# Patient Record
Sex: MALE | Race: OTHER | HISPANIC OR LATINO | Employment: FULL TIME | URBAN - METROPOLITAN AREA
[De-identification: names, ages, dates, MRNs, and addresses within clinical notes are randomized per-mention and may not be internally consistent; named-entity substitution may affect disease eponyms.]

---

## 2017-03-22 ENCOUNTER — ALLSCRIPTS OFFICE VISIT (OUTPATIENT)
Dept: OTHER | Facility: OTHER | Age: 42
End: 2017-03-22

## 2018-01-12 NOTE — PROGRESS NOTES
Assessment    1  Encounter for preventive health examination (V70 0) (Z00 00)   2  Lipid screening (V77 91) (Z13 220)   3  Screen for colon cancer (V76 51) (Z12 11)   4  Need for diphtheria-tetanus-pertussis (Tdap) vaccine, adult/adolescent (V06 1) (Z23)    Plan  FamHx: Colon cancer, Screen for colon cancer    · COLONOSCOPY; Status:Active; Requested for:22Mar2017; Health Maintenance, Lipid screening    · (1) COMPREHENSIVE METABOLIC PANEL; Status:Active; Requested for:22Mar2017;    · (1) LIPID PANEL, FASTING; Status:Active; Requested for:22Mar2017;   Need for diphtheria-tetanus-pertussis (Tdap) vaccine, adult/adolescent    · Tdap (Adacel); INJECT 0 5  ML Intramuscular; To Be Done: [id number]    Discussion/Summary  Impression: health maintenance visit, healthy adult male  Currently, he eats an adequate diet and has an adequate exercise regimen  Prostate cancer screening: PSA is not indicated  Testicular cancer screening: monthly self testicular exam was advised  Colorectal cancer screening: the risks and benefits of colorectal cancer screening were discussed and colonoscopy has been ordered  Screening lab work includes glucose and lipid profile  The immunizations will be given as outlined in the orders  He was advised to be evaluated by an optometrist and a dentist  Advice and education were given regarding nutrition, aerobic exercise, weight bearing exercise, weight loss, cardiovascular risk reduction, sunscreen use, self skin examination and seat belt use  Patient discussion: discussed with the patient  The patient was counseled regarding diagnostic results, instructions for management, risk factor reductions, prognosis, patient and family education, impressions, risks and benefits of treatment options, importance of compliance with treatment  Immunization Counseling The parent/guardian was counseled on the following vaccine components: Tdap  Total number of vaccine components counseled: 3     Possible side effects of new medications were reviewed with the patient/guardian today  The treatment plan was reviewed with the patient/guardian  The patient/guardian understands and agrees with the treatment plan      Chief Complaint  NPA Here for a physical      History of Present Illness  HM, Adult Male: The patient is being seen for a health maintenance evaluation  The last health maintenance visit was year(s) ago  Social History: Household members include spouse and 2 son(s)  He is   Work status: working full time and occupation: receiving/shipping  The patient has never smoked cigarettes  He reports rare alcohol use  He has never used illicit drugs  General Health: The patient's health since the last visit is described as good  He does not have regular dental visits  He complains of vision problems  Vision care includes no need for vision correction and no recent eye examination  He denies hearing loss  Immunizations status:  ?Tdap done with VISA 6 yrs ago  Lifestyle:  He consumes a diverse and healthy diet  (healthy, diverse -- protein from lean meat/fish )   He exercises regularly  He exercises 3 or more times per week  Exercise includes walking, aerobic conditioning and soccer 1-2x per wk  Reproductive health:  the patient is sexually active  He denies erectile dysfunction  Screening: cancer screening reviewed and current  Prostate cancer screening includes no previous evaluation  Testicular cancer screening includes irregular self testicular examinations  Colorectal cancer screening includes no previous screening  metabolic screening reviewed and current  risk screening reviewed and current  Safety elements used: seat belt, safe driving habits, sunscreen and smoke detector  Review of Systems    Constitutional: No fever or chills, feels well, no tiredness, no recent weight gain or weight loss  Eyes: no eye pain and no eyesight problems     ENT: no earache, no sore throat and no nasal discharge  Cardiovascular: no chest pain, no palpitations and no extremity edema  Respiratory: no shortness of breath, no cough and no wheezing  Gastrointestinal: No complaints of abdominal pain, no constipation, no nausea or vomiting, no diarrhea or bloody stools  Genitourinary: no dysuria and no incontinence  Musculoskeletal: no arthralgias and no myalgias  Integumentary: no rashes and no skin lesions  Neurological: no headache, no numbness, no tingling and no confusion  Psychiatric: no anxiety and no depression  Endocrine: no hot flashes and no feelings of weakness  Hematologic/Lymphatic: no swollen glands, no tendency for easy bleeding and no tendency for easy bruising  Over the past 2 weeks, how often have you been bothered by the following problems? 1 ) Little interest or pleasure in doing things? Not at all    2 ) Feeling down, depressed or hopeless? Not at all  ROS reviewed  Past Medical History    · History of Tinnitus, unspecified laterality (388 30) (H93 19)    Surgical History    · Denied: History Of Prior Surgery    Family History  Mother    · Family history of Colon cancer   · Family history of malignant neoplasm of stomach (V16 0) (Z80 0)  Grandparent    · Family history of diabetes mellitus (V18 0) (Z83 3)  Maternal Grandmother    · Family history of Type 2 diabetes mellitus without complication, unspecified long term  insulin use status  Grandfather    · Family history of malignant neoplasm (V16 9) (Z80 9)  Maternal Grandfather    · Family history of myocardial infarction (V17 3) (Z82 49)    Social History    · Never a smoker   · No alcohol use   · No illicit drug use    Current Meds   1  No Reported Medications Recorded    Allergies    1  No Known Drug Allergies    2  No Known Environmental Allergies   3   No Known Food Allergies    Vitals   Recorded: [id number] 02:59PM   Temperature 98 1 F   Heart Rate 74   Respiration 16   Systolic 803   Diastolic 82   Height 5 ft 6 in   Weight 151 lb    BMI Calculated 24 37   BSA Calculated 1 77   O2 Saturation 98     Physical Exam    Constitutional   General appearance: No acute distress, well appearing and well nourished  Head and Face   Head and face: Normal     Palpation of the face and sinuses: No sinus tenderness  Eyes   Conjunctiva and lids: No erythema, swelling or discharge  Pupils and irises: Equal, round, reactive to light  Ears, Nose, Mouth, and Throat   External inspection of ears and nose: Normal     Otoscopic examination: Tympanic membranes translucent with normal light reflex  Canals patent without erythema  Hearing: Normal     Nasal mucosa, septum, and turbinates: Normal without edema or erythema  Lips, teeth, and gums: Normal, good dentition  Oropharynx: Normal with no erythema, edema, exudate or lesions  Neck   Neck: Supple, symmetric, trachea midline, no masses  Thyroid: Normal, no thyromegaly  Pulmonary   Respiratory effort: No increased work of breathing or signs of respiratory distress  Auscultation of lungs: Clear to auscultation  Cardiovascular   Auscultation of heart: Normal rate and rhythm, normal S1 and S2, no murmurs  Examination of extremities for edema and/or varicosities: Normal     Abdomen   Abdomen: Non-tender, no masses  Liver and spleen: No hepatomegaly or splenomegaly  Lymphatic   Palpation of lymph nodes in neck: No lymphadenopathy  Musculoskeletal   Gait and station: Normal     Inspection/palpation of digits and nails: Normal without clubbing or cyanosis  Inspection/palpation of joints, bones, and muscles: Normal     Range of motion: Normal     Stability: Normal     Muscle strength/tone: Normal     Skin   Skin and subcutaneous tissue: Normal without rashes or lesions  Neurologic   Cranial nerves: Cranial nerves 2-12 intact  Reflexes: 2+ and symmetric  Sensation: No sensory loss      Psychiatric   Orientation to person, place and time: Normal  Mood and affect: Normal        Signatures   Electronically signed by : Arvind Sharma DO; Mar 22 2017  4:01PM EST                       (Author)

## 2018-01-13 VITALS
DIASTOLIC BLOOD PRESSURE: 82 MMHG | BODY MASS INDEX: 24.27 KG/M2 | SYSTOLIC BLOOD PRESSURE: 128 MMHG | HEART RATE: 74 BPM | HEIGHT: 66 IN | OXYGEN SATURATION: 98 % | WEIGHT: 151 LBS | RESPIRATION RATE: 16 BRPM | TEMPERATURE: 98.1 F

## 2019-04-01 ENCOUNTER — OFFICE VISIT (OUTPATIENT)
Dept: FAMILY MEDICINE CLINIC | Facility: OTHER | Age: 44
End: 2019-04-01
Payer: COMMERCIAL

## 2019-04-01 VITALS
BODY MASS INDEX: 26.81 KG/M2 | HEIGHT: 66 IN | DIASTOLIC BLOOD PRESSURE: 78 MMHG | TEMPERATURE: 97.7 F | WEIGHT: 166.8 LBS | HEART RATE: 74 BPM | OXYGEN SATURATION: 98 % | SYSTOLIC BLOOD PRESSURE: 104 MMHG

## 2019-04-01 DIAGNOSIS — R59.0 CERVICAL ADENOPATHY: Primary | ICD-10-CM

## 2019-04-01 DIAGNOSIS — R51.9 ACUTE NONINTRACTABLE HEADACHE, UNSPECIFIED HEADACHE TYPE: ICD-10-CM

## 2019-04-01 DIAGNOSIS — R49.0 HOARSENESS: ICD-10-CM

## 2019-04-01 PROBLEM — R43.9 SMELL DISTURBANCE: Status: ACTIVE | Noted: 2019-04-01

## 2019-04-01 PROCEDURE — 99214 OFFICE O/P EST MOD 30 MIN: CPT | Performed by: NURSE PRACTITIONER

## 2019-04-01 PROCEDURE — 3008F BODY MASS INDEX DOCD: CPT | Performed by: NURSE PRACTITIONER

## 2019-04-03 ENCOUNTER — APPOINTMENT (OUTPATIENT)
Dept: LAB | Facility: CLINIC | Age: 44
End: 2019-04-03
Payer: COMMERCIAL

## 2019-04-03 DIAGNOSIS — R59.0 CERVICAL ADENOPATHY: ICD-10-CM

## 2019-04-03 DIAGNOSIS — R51.9 ACUTE NONINTRACTABLE HEADACHE, UNSPECIFIED HEADACHE TYPE: ICD-10-CM

## 2019-04-03 DIAGNOSIS — R49.0 HOARSENESS: ICD-10-CM

## 2019-04-03 LAB
ALBUMIN SERPL BCP-MCNC: 4.2 G/DL (ref 3.5–5)
ALP SERPL-CCNC: 71 U/L (ref 46–116)
ALT SERPL W P-5'-P-CCNC: 42 U/L (ref 12–78)
ANION GAP SERPL CALCULATED.3IONS-SCNC: 8 MMOL/L (ref 4–13)
AST SERPL W P-5'-P-CCNC: 28 U/L (ref 5–45)
BASOPHILS # BLD AUTO: 0.03 THOUSANDS/ΜL (ref 0–0.1)
BASOPHILS NFR BLD AUTO: 1 % (ref 0–1)
BILIRUB SERPL-MCNC: 0.6 MG/DL (ref 0.2–1)
BUN SERPL-MCNC: 11 MG/DL (ref 5–25)
CALCIUM SERPL-MCNC: 9.3 MG/DL (ref 8.3–10.1)
CHLORIDE SERPL-SCNC: 103 MMOL/L (ref 100–108)
CO2 SERPL-SCNC: 28 MMOL/L (ref 21–32)
CREAT SERPL-MCNC: 1 MG/DL (ref 0.6–1.3)
CRP SERPL QL: 4 MG/L
EOSINOPHIL # BLD AUTO: 0.1 THOUSAND/ΜL (ref 0–0.61)
EOSINOPHIL NFR BLD AUTO: 2 % (ref 0–6)
ERYTHROCYTE [DISTWIDTH] IN BLOOD BY AUTOMATED COUNT: 11.6 % (ref 11.6–15.1)
ERYTHROCYTE [SEDIMENTATION RATE] IN BLOOD: 1 MM/HOUR (ref 0–10)
GFR SERPL CREATININE-BSD FRML MDRD: 92 ML/MIN/1.73SQ M
GLUCOSE P FAST SERPL-MCNC: 94 MG/DL (ref 65–99)
HCT VFR BLD AUTO: 45.2 % (ref 36.5–49.3)
HGB BLD-MCNC: 15.3 G/DL (ref 12–17)
IMM GRANULOCYTES # BLD AUTO: 0.02 THOUSAND/UL (ref 0–0.2)
IMM GRANULOCYTES NFR BLD AUTO: 0 % (ref 0–2)
LYMPHOCYTES # BLD AUTO: 1.89 THOUSANDS/ΜL (ref 0.6–4.47)
LYMPHOCYTES NFR BLD AUTO: 29 % (ref 14–44)
MCH RBC QN AUTO: 30.4 PG (ref 26.8–34.3)
MCHC RBC AUTO-ENTMCNC: 33.8 G/DL (ref 31.4–37.4)
MCV RBC AUTO: 90 FL (ref 82–98)
MONOCYTES # BLD AUTO: 0.48 THOUSAND/ΜL (ref 0.17–1.22)
MONOCYTES NFR BLD AUTO: 7 % (ref 4–12)
NEUTROPHILS # BLD AUTO: 4.11 THOUSANDS/ΜL (ref 1.85–7.62)
NEUTS SEG NFR BLD AUTO: 61 % (ref 43–75)
NRBC BLD AUTO-RTO: 0 /100 WBCS
PLATELET # BLD AUTO: 202 THOUSANDS/UL (ref 149–390)
PMV BLD AUTO: 10 FL (ref 8.9–12.7)
POTASSIUM SERPL-SCNC: 4 MMOL/L (ref 3.5–5.3)
PROT SERPL-MCNC: 7.5 G/DL (ref 6.4–8.2)
RBC # BLD AUTO: 5.04 MILLION/UL (ref 3.88–5.62)
SODIUM SERPL-SCNC: 139 MMOL/L (ref 136–145)
WBC # BLD AUTO: 6.63 THOUSAND/UL (ref 4.31–10.16)

## 2019-04-03 PROCEDURE — 36415 COLL VENOUS BLD VENIPUNCTURE: CPT

## 2019-04-03 PROCEDURE — 85025 COMPLETE CBC W/AUTO DIFF WBC: CPT

## 2019-04-03 PROCEDURE — 85652 RBC SED RATE AUTOMATED: CPT

## 2019-04-03 PROCEDURE — 86664 EPSTEIN-BARR NUCLEAR ANTIGEN: CPT

## 2019-04-03 PROCEDURE — 86140 C-REACTIVE PROTEIN: CPT

## 2019-04-03 PROCEDURE — 86665 EPSTEIN-BARR CAPSID VCA: CPT

## 2019-04-03 PROCEDURE — 80053 COMPREHEN METABOLIC PANEL: CPT

## 2019-04-03 PROCEDURE — 86663 EPSTEIN-BARR ANTIBODY: CPT

## 2019-04-04 LAB
EBV EA IGG SER-ACNC: <9 U/ML (ref 0–8.9)
EBV NA IGG SER IA-ACNC: 74.2 U/ML (ref 0–17.9)
EBV PATRN SPEC IB-IMP: ABNORMAL
EBV VCA IGG SER IA-ACNC: 223 U/ML (ref 0–17.9)
EBV VCA IGM SER IA-ACNC: <36 U/ML (ref 0–35.9)

## 2019-04-05 ENCOUNTER — TELEPHONE (OUTPATIENT)
Dept: FAMILY MEDICINE CLINIC | Facility: OTHER | Age: 44
End: 2019-04-05

## 2019-04-15 ENCOUNTER — HOSPITAL ENCOUNTER (OUTPATIENT)
Dept: ULTRASOUND IMAGING | Facility: HOSPITAL | Age: 44
Discharge: HOME | End: 2019-04-15
Attending: NURSE PRACTITIONER
Payer: COMMERCIAL

## 2019-04-15 DIAGNOSIS — R59.0 CERVICAL ADENOPATHY: ICD-10-CM

## 2019-04-15 PROCEDURE — 76536 US EXAM OF HEAD AND NECK: CPT

## 2019-07-15 ENCOUNTER — TRANSCRIBE ORDERS (OUTPATIENT)
Dept: LAB | Facility: CLINIC | Age: 44
End: 2019-07-15

## 2019-07-15 ENCOUNTER — APPOINTMENT (OUTPATIENT)
Dept: LAB | Facility: CLINIC | Age: 44
End: 2019-07-15
Payer: COMMERCIAL

## 2019-07-15 DIAGNOSIS — K21.9 GASTROESOPHAGEAL REFLUX DISEASE, ESOPHAGITIS PRESENCE NOT SPECIFIED: Primary | ICD-10-CM

## 2019-07-15 DIAGNOSIS — J38.00 VOCAL CORD PARESIS: ICD-10-CM

## 2019-07-15 DIAGNOSIS — R47.02 DYSPHASIA: ICD-10-CM

## 2019-07-15 DIAGNOSIS — K21.9 GASTROESOPHAGEAL REFLUX DISEASE, ESOPHAGITIS PRESENCE NOT SPECIFIED: ICD-10-CM

## 2019-07-15 DIAGNOSIS — K90.41 GLUTEN INTOLERANCE: ICD-10-CM

## 2019-07-15 LAB
T4 FREE SERPL-MCNC: 0.89 NG/DL (ref 0.76–1.46)
TSH SERPL DL<=0.05 MIU/L-ACNC: 1.31 UIU/ML (ref 0.36–3.74)

## 2019-07-15 PROCEDURE — 83519 RIA NONANTIBODY: CPT

## 2019-07-15 PROCEDURE — 86618 LYME DISEASE ANTIBODY: CPT

## 2019-07-15 PROCEDURE — 83516 IMMUNOASSAY NONANTIBODY: CPT

## 2019-07-15 PROCEDURE — 86430 RHEUMATOID FACTOR TEST QUAL: CPT

## 2019-07-15 PROCEDURE — 36415 COLL VENOUS BLD VENIPUNCTURE: CPT

## 2019-07-15 PROCEDURE — 86038 ANTINUCLEAR ANTIBODIES: CPT

## 2019-07-15 PROCEDURE — 84443 ASSAY THYROID STIM HORMONE: CPT

## 2019-07-15 PROCEDURE — 84439 ASSAY OF FREE THYROXINE: CPT

## 2019-07-16 LAB
B BURGDOR IGG SER IA-ACNC: 0.11
B BURGDOR IGM SER IA-ACNC: 0.37
RHEUMATOID FACT SER QL LA: NEGATIVE

## 2019-07-17 LAB — RYE IGE QN: NEGATIVE

## 2019-07-22 LAB — ACHR MOD AB/ACHR TOTAL SFR SER: <12 % (ref 0–20)

## 2019-07-23 LAB — MISCELLANEOUS LAB TEST RESULT: NORMAL

## 2019-07-25 ENCOUNTER — HOSPITAL ENCOUNTER (EMERGENCY)
Facility: HOSPITAL | Age: 44
Discharge: HOME | End: 2019-07-25
Attending: EMERGENCY MEDICINE | Admitting: EMERGENCY MEDICINE
Payer: COMMERCIAL

## 2019-07-25 ENCOUNTER — APPOINTMENT (EMERGENCY)
Dept: CT IMAGING | Facility: HOSPITAL | Age: 44
End: 2019-07-25
Payer: COMMERCIAL

## 2019-07-25 VITALS
WEIGHT: 160.94 LBS | TEMPERATURE: 98.7 F | DIASTOLIC BLOOD PRESSURE: 80 MMHG | OXYGEN SATURATION: 98 % | BODY MASS INDEX: 28.51 KG/M2 | RESPIRATION RATE: 16 BRPM | SYSTOLIC BLOOD PRESSURE: 135 MMHG | HEART RATE: 83 BPM

## 2019-07-25 DIAGNOSIS — R10.9 ACUTE RIGHT FLANK PAIN: Primary | ICD-10-CM

## 2019-07-25 LAB
ALBUMIN SERPL BCP-MCNC: 4 G/DL (ref 3.5–5)
ALP SERPL-CCNC: 69 U/L (ref 46–116)
ALT SERPL W P-5'-P-CCNC: 36 U/L (ref 12–78)
ANION GAP SERPL CALCULATED.3IONS-SCNC: 7 MMOL/L (ref 4–13)
AST SERPL W P-5'-P-CCNC: 25 U/L (ref 5–45)
BACTERIA UR QL AUTO: ABNORMAL /HPF
BASOPHILS # BLD AUTO: 0.02 THOUSANDS/ΜL (ref 0–0.1)
BASOPHILS NFR BLD AUTO: 0 % (ref 0–1)
BILIRUB SERPL-MCNC: 0.4 MG/DL (ref 0.2–1)
BILIRUB UR QL STRIP: NEGATIVE
BUN SERPL-MCNC: 13 MG/DL (ref 5–25)
CALCIUM SERPL-MCNC: 8.5 MG/DL (ref 8.3–10.1)
CHLORIDE SERPL-SCNC: 100 MMOL/L (ref 100–108)
CLARITY UR: CLEAR
CO2 SERPL-SCNC: 29 MMOL/L (ref 21–32)
COLOR UR: YELLOW
CREAT SERPL-MCNC: 1.12 MG/DL (ref 0.6–1.3)
EOSINOPHIL # BLD AUTO: 0.06 THOUSAND/ΜL (ref 0–0.61)
EOSINOPHIL NFR BLD AUTO: 1 % (ref 0–6)
ERYTHROCYTE [DISTWIDTH] IN BLOOD BY AUTOMATED COUNT: 11.1 % (ref 11.6–15.1)
GFR SERPL CREATININE-BSD FRML MDRD: 80 ML/MIN/1.73SQ M
GLUCOSE SERPL-MCNC: 91 MG/DL (ref 65–140)
GLUCOSE UR STRIP-MCNC: NEGATIVE MG/DL
HCT VFR BLD AUTO: 39 % (ref 36.5–49.3)
HGB BLD-MCNC: 13.1 G/DL (ref 12–17)
HGB UR QL STRIP.AUTO: ABNORMAL
IMM GRANULOCYTES # BLD AUTO: 0.03 THOUSAND/UL (ref 0–0.2)
IMM GRANULOCYTES NFR BLD AUTO: 0 % (ref 0–2)
KETONES UR STRIP-MCNC: NEGATIVE MG/DL
LEUKOCYTE ESTERASE UR QL STRIP: ABNORMAL
LYMPHOCYTES # BLD AUTO: 1.95 THOUSANDS/ΜL (ref 0.6–4.47)
LYMPHOCYTES NFR BLD AUTO: 17 % (ref 14–44)
MCH RBC QN AUTO: 30.5 PG (ref 26.8–34.3)
MCHC RBC AUTO-ENTMCNC: 33.6 G/DL (ref 31.4–37.4)
MCV RBC AUTO: 91 FL (ref 82–98)
MONOCYTES # BLD AUTO: 0.86 THOUSAND/ΜL (ref 0.17–1.22)
MONOCYTES NFR BLD AUTO: 7 % (ref 4–12)
NEUTROPHILS # BLD AUTO: 8.78 THOUSANDS/ΜL (ref 1.85–7.62)
NEUTS SEG NFR BLD AUTO: 75 % (ref 43–75)
NITRITE UR QL STRIP: NEGATIVE
NON-SQ EPI CELLS URNS QL MICRO: ABNORMAL /HPF
NRBC BLD AUTO-RTO: 0 /100 WBCS
PH UR STRIP.AUTO: 7 [PH] (ref 4.5–8)
PLATELET # BLD AUTO: 185 THOUSANDS/UL (ref 149–390)
PMV BLD AUTO: 9.9 FL (ref 8.9–12.7)
POTASSIUM SERPL-SCNC: 3.7 MMOL/L (ref 3.5–5.3)
PROT SERPL-MCNC: 7.4 G/DL (ref 6.4–8.2)
PROT UR STRIP-MCNC: NEGATIVE MG/DL
RBC # BLD AUTO: 4.29 MILLION/UL (ref 3.88–5.62)
RBC #/AREA URNS AUTO: ABNORMAL /HPF
SODIUM SERPL-SCNC: 136 MMOL/L (ref 136–145)
SP GR UR STRIP.AUTO: 1.01 (ref 1–1.03)
UROBILINOGEN UR QL STRIP.AUTO: 0.2 E.U./DL
WBC # BLD AUTO: 11.7 THOUSAND/UL (ref 4.31–10.16)
WBC #/AREA URNS AUTO: ABNORMAL /HPF

## 2019-07-25 PROCEDURE — 99284 EMERGENCY DEPT VISIT MOD MDM: CPT

## 2019-07-25 PROCEDURE — 87086 URINE CULTURE/COLONY COUNT: CPT

## 2019-07-25 PROCEDURE — 80053 COMPREHEN METABOLIC PANEL: CPT | Performed by: EMERGENCY MEDICINE

## 2019-07-25 PROCEDURE — 81001 URINALYSIS AUTO W/SCOPE: CPT

## 2019-07-25 PROCEDURE — 87186 SC STD MICRODIL/AGAR DIL: CPT

## 2019-07-25 PROCEDURE — 36415 COLL VENOUS BLD VENIPUNCTURE: CPT

## 2019-07-25 PROCEDURE — 99284 EMERGENCY DEPT VISIT MOD MDM: CPT | Performed by: EMERGENCY MEDICINE

## 2019-07-25 PROCEDURE — 74176 CT ABD & PELVIS W/O CONTRAST: CPT

## 2019-07-25 PROCEDURE — 85025 COMPLETE CBC W/AUTO DIFF WBC: CPT | Performed by: EMERGENCY MEDICINE

## 2019-07-25 PROCEDURE — 87077 CULTURE AEROBIC IDENTIFY: CPT

## 2019-07-26 NOTE — ED PROVIDER NOTES
History  Chief Complaint   Patient presents with    Back Pain     pt states he has pain in lower back on both sides and states he has "blood in in his urine and decreased urination "  and states he also has leg cramps and slight headache  hx of kidney stones and states it feels similar  38 y o  Male presents with chief complaint of low back pain  He also reports dysuria and hematuria  Patient has a history of left sided ureterolithiasis in the past   No fevers, chills, nausea, vomiting  Patient does report leg cramps however  History provided by:  Patient   used: No    Back Pain   Quality:  Aching  Radiates to:  Does not radiate  Pain severity:  Mild  Onset quality:  Gradual  Duration:  1 day  Timing:  Rare  Progression:  Resolved  Chronicity:  New  Relieved by:  Nothing  Worsened by:  Ambulation and bending  Ineffective treatments:  None tried  Associated symptoms: dysuria    Associated symptoms: no chest pain and no fever        Prior to Admission Medications   Prescriptions Last Dose Informant Patient Reported?  Taking?   famotidine (PEPCID) 40 MG tablet 7/24/2019 at Unknown time  No Yes   Sig: Take 1 tablet (40 mg total) by mouth daily at bedtime   pantoprazole (PROTONIX) 40 mg tablet   No No   Sig: Take 1 tablet (40 mg total) by mouth daily   ranitidine (ZANTAC) 300 MG tablet 7/24/2019 at Unknown time  No Yes   Sig: Take 1 tablet (300 mg total) by mouth daily at bedtime for 30 days      Facility-Administered Medications: None       Past Medical History:   Diagnosis Date    Kidney stones     Tinnitus        Past Surgical History:   Procedure Laterality Date    NO PAST SURGERIES         Family History   Problem Relation Age of Onset    Colon cancer Mother     Stomach cancer Mother     No Known Problems Father     Diabetes Maternal Grandmother     Heart attack Maternal Grandfather     Diabetes Other     Cancer Other      I have reviewed and agree with the history as documented  Social History     Tobacco Use    Smoking status: Never Smoker    Smokeless tobacco: Never Used   Substance Use Topics    Alcohol use: No    Drug use: No        Review of Systems   Constitutional: Negative for chills, diaphoresis and fever  Respiratory: Negative for shortness of breath  Cardiovascular: Negative for chest pain and palpitations  Gastrointestinal: Negative for diarrhea, nausea and vomiting  Genitourinary: Positive for dysuria and hematuria  Negative for frequency  Musculoskeletal: Positive for back pain  Skin: Negative for rash  All other systems reviewed and are negative  Physical Exam  Physical Exam   Constitutional: He is oriented to person, place, and time  He appears well-developed and well-nourished  No distress  HENT:   Head: Normocephalic and atraumatic  Eyes: Pupils are equal, round, and reactive to light  EOM are normal    Neck: Normal range of motion  No JVD present  Cardiovascular: Normal rate, regular rhythm, normal heart sounds and intact distal pulses  Exam reveals no gallop and no friction rub  No murmur heard  Pulmonary/Chest: Effort normal and breath sounds normal  No respiratory distress  He has no wheezes  He has no rales  He exhibits no tenderness  Musculoskeletal: Normal range of motion  He exhibits no tenderness  Neurological: He is alert and oriented to person, place, and time  Skin: Skin is warm and dry  Psychiatric: He has a normal mood and affect  His behavior is normal  Judgment and thought content normal    Nursing note and vitals reviewed        Vital Signs  ED Triage Vitals [07/25/19 2018]   Temperature Pulse Respirations Blood Pressure SpO2   98 7 °F (37 1 °C) 92 18 138/97 96 %      Temp Source Heart Rate Source Patient Position - Orthostatic VS BP Location FiO2 (%)   Oral Monitor Sitting Right arm --      Pain Score       5           Vitals:    07/25/19 2018 07/25/19 2154   BP: 138/97 135/80   Pulse: 92 83   Patient Position - Orthostatic VS: Sitting Sitting         Visual Acuity      ED Medications  Medications - No data to display    Diagnostic Studies  Results Reviewed     Procedure Component Value Units Date/Time    Comprehensive metabolic panel [[id number]] Collected:  07/25/19 2101    Lab Status:  Final result Specimen:  Blood from Arm, Right Updated:  07/25/19 2120     Sodium 136 mmol/L      Potassium 3 7 mmol/L      Chloride 100 mmol/L      CO2 29 mmol/L      ANION GAP 7 mmol/L      BUN 13 mg/dL      Creatinine 1 12 mg/dL      Glucose 91 mg/dL      Calcium 8 5 mg/dL      AST 25 U/L      ALT 36 U/L      Alkaline Phosphatase 69 U/L      Total Protein 7 4 g/dL      Albumin 4 0 g/dL      Total Bilirubin 0 40 mg/dL      eGFR 80 ml/min/1 73sq m     Narrative:       [place] guidelines for Chronic Kidney Disease (CKD):     Stage 1 with normal or high GFR (GFR > 90 mL/min/1 73 square meters)    Stage 2 Mild CKD (GFR = 60-89 mL/min/1 73 square meters)    Stage 3A Moderate CKD (GFR = 45-59 mL/min/1 73 square meters)    Stage 3B Moderate CKD (GFR = 30-44 mL/min/1 73 square meters)    Stage 4 Severe CKD (GFR = 15-29 mL/min/1 73 square meters)    Stage 5 End Stage CKD (GFR <15 mL/min/1 73 square meters)  Note: GFR calculation is accurate only with a steady state creatinine    Urine Microscopic [[id number]]  (Abnormal) Collected:  07/25/19 2114    Lab Status:  Final result Specimen:  Urine, Clean Catch Updated:  07/25/19 2118     RBC, UA 2-4 /hpf      WBC, UA 20-30 /hpf      Epithelial Cells Occasional /hpf      Bacteria, UA Occasional /hpf     Urine culture [[id number]] Collected:  07/25/19 2114    Lab Status:   In process Specimen:  Urine, Clean Catch Updated:  07/25/19 2118    CBC and differential [[id number]]  (Abnormal) Collected:  07/25/19 2101    Lab Status:  Final result Specimen:  Blood from Arm, Right Updated:  07/25/19 2107     WBC 11 70 Thousand/uL      RBC 4 29 Million/uL      Hemoglobin 13 1 g/dL      Hematocrit 39 0 %      MCV 91 fL      MCH 30 5 pg      MCHC 33 6 g/dL      RDW 11 1 %      MPV 9 9 fL      Platelets 657 Thousands/uL      nRBC 0 /100 WBCs      Neutrophils Relative 75 %      Immat GRANS % 0 %      Lymphocytes Relative 17 %      Monocytes Relative 7 %      Eosinophils Relative 1 %      Basophils Relative 0 %      Neutrophils Absolute 8 78 Thousands/µL      Immature Grans Absolute 0 03 Thousand/uL      Lymphocytes Absolute 1 95 Thousands/µL      Monocytes Absolute 0 86 Thousand/µL      Eosinophils Absolute 0 06 Thousand/µL      Basophils Absolute 0 02 Thousands/µL     ED Urine Macroscopic [[id number]]  (Abnormal) Collected:  07/25/19 2114    Lab Status:  Final result Specimen:  Urine Updated:  07/25/19 2105     Color, UA Yellow     Clarity, UA Clear     pH, UA 7 0     Leukocytes, UA Large     Nitrite, UA Negative     Protein, UA Negative mg/dl      Glucose, UA Negative mg/dl      Ketones, UA Negative mg/dl      Urobilinogen, UA 0 2 E U /dl      Bilirubin, UA Negative     Blood, UA Moderate     Specific Philipp, UA 1 010    Narrative:       [facility] RESULT                 CT renal stone study abdomen pelvis without contrast   Final Result by Sanjana Soto DO (07/25 2216)   Nonobstructing right-sided renal collecting system calculi  No hydronephrosis or hydroureter on either side  No evidence of renal colic  Probable left renal cyst   Ultrasound could be used to confirm if clinically warranted  Urinary bladder wall thickening suggesting cystitis  No bladder stones  Partially formed stool in the distal small bowel and formed stool in the right colon suggesting constipation/delayed GI transit  No obstruction  Rare colonic diverticula                 Workstation performed: [id number]                    Procedures  Procedures       ED Course                               MDM  Number of Diagnoses or Management Options  Acute right flank pain: new and requires workup  Diagnosis management comments: Background: 38 y o  male with bilateral flank pain    Differential DX includes but is not limited to: ureterolithiasis, pyelonephritis, musculoskeletal flank pain, costochondritis, mesenteric adenitis    Plan: cbc, bmp, urinalysis, ct stone study         Amount and/or Complexity of Data Reviewed  Clinical lab tests: ordered and reviewed  Tests in the radiology section of CPT®: ordered and reviewed    Risk of Complications, Morbidity, and/or Mortality  Presenting problems: high  Diagnostic procedures: high  Management options: high    Patient Progress  Patient progress: stable      Disposition  Final diagnoses:   Acute right flank pain     Time reflects when diagnosis was documented in both MDM as applicable and the Disposition within this note     Time User Action Codes Description Comment    7/25/2019 10:24 PM Cory GUERRA Add [R10 9] Acute right flank pain       ED Disposition     ED Disposition Condition Date/Time Comment    Discharge Stable u Jul 25, 2019 10:24 PM Stephani Escobar discharge to home/self care  Follow-up Information     Follow up With Specialties Details Why Contact Info    Lynnette Man, [address], Nurse Practitioner Schedule an appointment as soon as possible for a visit in 1 week  [address] [place] [address]            Discharge Medication List as of 7/25/2019 10:24 PM      CONTINUE these medications which have NOT CHANGED    Details   famotidine (PEPCID) 40 MG tablet Take 1 tablet (40 mg total) by mouth daily at bedtime, Starting Mon 7/22/2019, Normal      pantoprazole (PROTONIX) 40 mg tablet Take 1 tablet (40 mg total) by mouth daily, Starting Fri 6/21/2019, Normal      ranitidine (ZANTAC) 300 MG tablet Take 1 tablet (300 mg total) by mouth daily at bedtime for 30 days, Starting Mon 7/15/2019, Until Wed 8/14/2019, Normal           No discharge procedures on file      ED Provider  Electronically Signed by           Curtis Dutton MD  07/26/19 7794

## 2019-07-28 LAB — BACTERIA UR CULT: ABNORMAL

## 2019-08-05 ENCOUNTER — OFFICE VISIT (OUTPATIENT)
Dept: FAMILY MEDICINE CLINIC | Facility: OTHER | Age: 44
End: 2019-08-05
Payer: COMMERCIAL

## 2019-08-05 VITALS
WEIGHT: 153 LBS | DIASTOLIC BLOOD PRESSURE: 70 MMHG | OXYGEN SATURATION: 98 % | SYSTOLIC BLOOD PRESSURE: 100 MMHG | BODY MASS INDEX: 24.01 KG/M2 | TEMPERATURE: 99 F | HEART RATE: 66 BPM | HEIGHT: 67 IN

## 2019-08-05 DIAGNOSIS — N41.0 ACUTE PROSTATITIS: Primary | ICD-10-CM

## 2019-08-05 DIAGNOSIS — N20.0 KIDNEY STONES: ICD-10-CM

## 2019-08-05 PROCEDURE — 99214 OFFICE O/P EST MOD 30 MIN: CPT | Performed by: NURSE PRACTITIONER

## 2019-08-05 RX ORDER — LEVOFLOXACIN 500 MG/1
500 TABLET, FILM COATED ORAL EVERY 24 HOURS
Qty: 14 TABLET | Refills: 1 | Status: SHIPPED | OUTPATIENT
Start: 2019-08-05 | End: 2019-08-19

## 2019-08-05 NOTE — PATIENT INSTRUCTIONS
Prostatitis   WHAT YOU NEED TO KNOW:   Prostatitis is inflammation of the prostate gland  The prostate gland is the male sex gland that makes semen  It is about the size of a walnut and it is located under the bladder  You can get prostatitis at any age, and you may get it more than once  It may be an acute (short-term) or chronic (long-term) condition  Prostatitis is not contagious  DISCHARGE INSTRUCTIONS:   Medicines:   · Alpha blockers: This medicine relaxes the muscles in your prostate and bladder  It may help you to urinate more easily  · Antibiotics: This medicine is given to fight or prevent an infection caused by bacteria  Always take your antibiotics exactly as ordered by your healthcare provider  Do not stop taking your medicine unless directed by your healthcare provider  Never save antibiotics or take leftover antibiotics that were given to you for another illness  · NSAIDs:  These medicines decrease swelling, pain, and fever  NSAIDs are available without a doctor's order  Ask your healthcare provider which medicine is right for you  Ask how much to take and when to take it  Take as directed  NSAIDs can cause stomach bleeding and kidney problems if not taken correctly  · Pain medicine: You may be given a prescription medicine to decrease pain  Do not wait until the pain is severe before you take this medicine  · Take your medicine as directed  Contact your healthcare provider if you think your medicine is not helping or if you have side effects  Tell him of her if you are allergic to any medicine  Keep a list of the medicines, vitamins, and herbs you take  Include the amounts, and when and why you take them  Bring the list or the pill bottles to follow-up visits  Carry your medicine list with you in case of an emergency  Follow up with your healthcare provider as directed:  Write down your questions so you remember to ask them during your visits     Self-care:   · Prostate massage may be used to treat chronic prostatitis  It can help to decrease fullness and prevent infection  Healthcare providers may teach you how to do a prostate massage  · Place a heating pad on the prostate area to help blood flow to that area  Warm baths may decrease prostate fullness and discomfort  · Drink plenty of liquids to prevent dehydration  Ask your healthcare provider how much liquid to drink each day and which liquids are best for you  · Do not drink alcohol or eat spicy foods until you have finished treatment for prostatitis  Limit the amount of caffeine you drink  · Urinate often  Do not wait to urinate  · You may have sex if you feel well  · Go to a healthcare provider for a prostate exam once every year if you are over the age of 36  Contact your healthcare provider if:   · You have a fever  · You see blood in your urine  · You cannot urinate  · Your symptoms are getting worse, or they return after you have been treated  · You have questions or concerns about your condition or care  © 2017 [facility] Information is for End User's use only and may not be sold, redistributed or otherwise used for commercial purposes  All illustrations and images included in CareNotes® are the copyrighted property of A D A M , Inc  or Mo Ferrell  The above information is an  only  It is not intended as medical advice for individual conditions or treatments  Talk to your doctor, nurse or pharmacist before following any medical regimen to see if it is safe and effective for you

## 2019-08-05 NOTE — PROGRESS NOTES
Assessment/Plan:         Problem List Items Addressed This Visit     Acute prostatitis   Urolithiasis  --History, exam, urine culture results c/w acute prostatitis as primary cause of his symptoms, although two small (<4mm) right sided calculi may have been contributing  He feels like he passed at least one of this stones within the past few days  --Will treat accordingly with antibiotic, fluids  --RTO 2 weeks  Will give slip for repeat UA, urine culture, possible renal ultrasound at that time  Call for new/worsening symptoms in the interim  Relevant Medications    levofloxacin (LEVAQUIN) 500 mg tablet QD x 14 days  Subjective:      Patient ID: Lonnie Crabtree is a 38 y o  male  Here as follow-up to ER visit CHRISTINE Sandhu 7/25 for complaints of bilateral LBP, dysuria, blood in urine x 2 days  Records reviewed  Urine dip with mod blood, large amount of leuks  Mild leukocytosis (11 7)  CT scan showing non-obstructing right renal calculi, possible left renal cyst, bladder wall thickening c/w cystitis  Urine culture came back positive for > 10^5 E  coli  No antibiotic or other meds prescribed  Advised to f/u with PCP only  Intermittent fever (most recently a week ago), nausea  No vomiting  Passed small stone yesterday  Urine still has odor  Dysuria improved somewhat, however  No frequency, urgency, bladder discomfort  No bladder/abdominal pain  No testicular pain  Drinking adequately  Previous history of urolithiasis, but denies (known) history of cystitis, prostatitis  The following portions of the patient's history were reviewed and updated as appropriate: current medications, past family history, past medical history, past social history, past surgical history and problem list     Review of Systems   Constitutional: Positive for fever  Gastrointestinal:        Per HPI   Genitourinary: Positive for dysuria and hematuria   Negative for discharge, flank pain, penile swelling, scrotal swelling, testicular pain and urgency  Musculoskeletal: Negative for myalgias  Objective:      /70 (BP Location: Left arm, Patient Position: Sitting, Cuff Size: Adult)   Pulse 66   Temp 99 °F (37 2 °C) (Tympanic)   Ht 5' 7" (1 702 m)   Wt 69 4 kg (153 lb)   SpO2 98%   BMI 23 96 kg/m²          Physical Exam   Constitutional: He is oriented to person, place, and time  He appears well-developed  No distress  Pulmonary/Chest: Effort normal    Abdominal: Soft  There is no tenderness  No CVA tenderness  Genitourinary: Rectum normal    Genitourinary Comments: Prostate mildly enlarged, boggy, firm, tender  Neurological: He is alert and oriented to person, place, and time  Skin: He is not diaphoretic

## 2019-08-07 PROBLEM — J38.02 BILATERAL VOCAL FOLD PARESIS: Status: ACTIVE | Noted: 2019-08-07

## 2019-08-07 PROBLEM — R09.89 SENSATION OF FOREIGN BODY IN THROAT: Status: ACTIVE | Noted: 2019-08-07

## 2019-08-07 PROBLEM — M26.609 TMJ DYSFUNCTION: Status: ACTIVE | Noted: 2019-08-07

## 2019-08-07 PROBLEM — R44.2 PHANTOSMIA: Status: ACTIVE | Noted: 2019-08-07

## 2019-08-07 PROBLEM — J04.0 REFLUX LARYNGITIS: Status: ACTIVE | Noted: 2019-08-07

## 2019-08-07 PROBLEM — K21.9 GASTROESOPHAGEAL REFLUX DISEASE: Status: ACTIVE | Noted: 2019-08-07

## 2019-08-07 PROBLEM — K14.6 BURNING MOUTH SYNDROME: Status: ACTIVE | Noted: 2019-08-07

## 2019-08-07 PROBLEM — R09.A2 SENSATION OF FOREIGN BODY IN THROAT: Status: ACTIVE | Noted: 2019-08-07

## 2019-08-07 PROBLEM — R05.9 COUGH: Status: ACTIVE | Noted: 2019-08-07

## 2019-08-07 PROBLEM — K21.9 REFLUX LARYNGITIS: Status: ACTIVE | Noted: 2019-08-07

## 2019-08-07 PROBLEM — R49.0 MUSCLE TENSION DYSPHONIA: Status: ACTIVE | Noted: 2019-08-07

## 2019-08-07 PROBLEM — R49.0 DYSPHONIA: Status: ACTIVE | Noted: 2019-08-07

## 2019-08-07 PROBLEM — J38.4 LARYNGEAL EDEMA: Status: ACTIVE | Noted: 2019-08-07

## 2019-08-07 PROBLEM — R59.0 ENLARGED LYMPH NODE IN NECK: Status: ACTIVE | Noted: 2019-08-07

## 2019-08-19 ENCOUNTER — OFFICE VISIT (OUTPATIENT)
Dept: FAMILY MEDICINE CLINIC | Facility: OTHER | Age: 44
End: 2019-08-19
Payer: COMMERCIAL

## 2019-08-19 VITALS
HEART RATE: 78 BPM | OXYGEN SATURATION: 99 % | SYSTOLIC BLOOD PRESSURE: 100 MMHG | BODY MASS INDEX: 24.11 KG/M2 | HEIGHT: 67 IN | WEIGHT: 153.6 LBS | DIASTOLIC BLOOD PRESSURE: 72 MMHG | TEMPERATURE: 97.7 F

## 2019-08-19 DIAGNOSIS — N41.0 ACUTE PROSTATITIS: Primary | ICD-10-CM

## 2019-08-19 DIAGNOSIS — N28.1 RENAL CYST: ICD-10-CM

## 2019-08-19 DIAGNOSIS — R31.9 HEMATURIA, UNSPECIFIED TYPE: ICD-10-CM

## 2019-08-19 PROCEDURE — 1036F TOBACCO NON-USER: CPT | Performed by: NURSE PRACTITIONER

## 2019-08-19 PROCEDURE — 3008F BODY MASS INDEX DOCD: CPT | Performed by: NURSE PRACTITIONER

## 2019-08-19 PROCEDURE — 99214 OFFICE O/P EST MOD 30 MIN: CPT | Performed by: NURSE PRACTITIONER

## 2019-08-19 NOTE — PROGRESS NOTES
Assessment/Plan:         Problem List Items Addressed This Visit     Acute prostatitis   Hematuria  --Symptoms fully resolved after 2 weeks of antibiotic  Prostate no longer tender on exam   No need for additional treatment at this time  --Repeat UA, culture next two weeks to ensure full resolution of infection + hematuria  --Stay well hydrated  --Call for recurrent symptoms    Relevant Orders    Urinalysis with reflex to microscopic    Urine culture    Left renal cyst (suspected on CT-->confirmatory ultrasound ordered)    Relevant Orders    US retroperitoneal complete            Subjective:      Patient ID: Bunny Ojeda is a 38 y o  male  Here as follow-up to office visit 8/5/19 for complaints of dysuria, lower back pain starting a week prior  See note for details  Diagnosed with acute prostatitis + cystitis and treated with antibiotic (Levaquin), which he completed yesterday without AE's except for mild stomach upset at times  Urine culture came back showing > 10^5 E  coli susceptible to fluoroquinolones  His urinary symptoms and lower back pain resolved within a week of starting antibiotic and have not recurred  Feels fine at present  No fever/chills, N/V, abdominal pain, back pain, dysuria, urgency, frequency  CT scan from 7/25 showed small (4 mm) right renal calculi which he feels he passed 3 weeks ago  Also showed probably left renal cyst with ultrasound advised for confirmation  The following portions of the patient's history were reviewed and updated as appropriate: current medications, past family history, past medical history, past social history, past surgical history and problem list     Review of Systems   Constitutional: Negative for fever  Gastrointestinal: Negative for abdominal pain  Genitourinary:        Per HPI   Musculoskeletal: Negative for back pain           Objective:      /72 (BP Location: Left arm, Patient Position: Sitting, Cuff Size: Adult)   Pulse 78   Temp 97 7 °F (36 5 °C) (Tympanic)   Ht 5' 7" (1 702 m)   Wt 69 7 kg (153 lb 9 6 oz)   SpO2 99%   BMI 24 06 kg/m²          Physical Exam   Constitutional: He is oriented to person, place, and time  He appears well-developed  No distress  Pulmonary/Chest: Effort normal    Abdominal: Soft  There is no tenderness  No CVA tenderness  Genitourinary: Prostate normal    Genitourinary Comments: Prostate smooth, non-enlarged, nontender   Neurological: He is alert and oriented to person, place, and time  Psychiatric: He has a normal mood and affect

## 2019-09-09 ENCOUNTER — APPOINTMENT (OUTPATIENT)
Dept: LAB | Facility: CLINIC | Age: 44
End: 2019-09-09
Payer: COMMERCIAL

## 2019-09-09 ENCOUNTER — HOSPITAL ENCOUNTER (OUTPATIENT)
Dept: ULTRASOUND IMAGING | Facility: HOSPITAL | Age: 44
Discharge: HOME | End: 2019-09-09
Attending: NURSE PRACTITIONER
Payer: COMMERCIAL

## 2019-09-09 DIAGNOSIS — N28.1 RENAL CYST: ICD-10-CM

## 2019-09-09 DIAGNOSIS — N41.0 ACUTE PROSTATITIS: ICD-10-CM

## 2019-09-09 DIAGNOSIS — R31.9 HEMATURIA, UNSPECIFIED TYPE: ICD-10-CM

## 2019-09-09 LAB
BILIRUB UR QL STRIP: NEGATIVE
CLARITY UR: CLEAR
COLOR UR: NORMAL
GLUCOSE UR STRIP-MCNC: NEGATIVE MG/DL
HGB UR QL STRIP.AUTO: NEGATIVE
KETONES UR STRIP-MCNC: NEGATIVE MG/DL
LEUKOCYTE ESTERASE UR QL STRIP: NEGATIVE
NITRITE UR QL STRIP: NEGATIVE
PH UR STRIP.AUTO: 5.5 [PH]
PROT UR STRIP-MCNC: NEGATIVE MG/DL
SP GR UR STRIP.AUTO: 1.01 (ref 1–1.03)
UROBILINOGEN UR QL STRIP.AUTO: 0.2 E.U./DL

## 2019-09-09 PROCEDURE — 76770 US EXAM ABDO BACK WALL COMP: CPT

## 2019-09-09 PROCEDURE — 87086 URINE CULTURE/COLONY COUNT: CPT

## 2019-09-09 PROCEDURE — 81003 URINALYSIS AUTO W/O SCOPE: CPT

## 2019-09-10 LAB — BACTERIA UR CULT: NORMAL

## 2019-09-11 ENCOUNTER — TELEPHONE (OUTPATIENT)
Dept: FAMILY MEDICINE CLINIC | Facility: OTHER | Age: 44
End: 2019-09-11

## 2019-09-11 NOTE — TELEPHONE ENCOUNTER
Left message for pt to return call    ----- Message from [id number] sent at 9/11/2019 11:38 AM EDT -----  Can we let Alize Lagunas know that his repeat urine test came back negative for further blood or infection   Thanks

## 2019-09-23 ENCOUNTER — TELEPHONE (OUTPATIENT)
Dept: FAMILY MEDICINE CLINIC | Facility: OTHER | Age: 44
End: 2019-09-23

## 2019-09-23 DIAGNOSIS — N28.1 RENAL CYST: Primary | ICD-10-CM

## 2019-09-23 NOTE — TELEPHONE ENCOUNTER
Left message for pt to return call      ----- Message from Golden Marshall Francesca  sent at 9/23/2019  9:55 AM EDT -----  Can we call Мария Burrell to inform of renal ultrasound results notable for mildly complex left renal cyst, along with small (4 mm) non-obstructing kidney stone on right side  Not a big concern at this point, but the radiologist is recommended a repeat ultrasound of the kidneys in 6 months as a precaution  Test ordered  Can we mail requisition to patient?   Thanks

## 2019-10-07 NOTE — TELEPHONE ENCOUNTER
Received a call from patient's wife requesting to speak with someone that speaks Venezuelan  I informed her that we did not have anyone available to speak Venezuelan so she put her son on the line to translate  Per patient's son, patient is out of the country and will return in two weeks  I informed him that the office was contacting him to give him the results of [place] and it is important that he call us back when he gets home

## 2019-10-28 ENCOUNTER — HOSPITAL ENCOUNTER (OUTPATIENT)
Dept: ULTRASOUND IMAGING | Facility: HOSPITAL | Age: 44
Discharge: HOME | End: 2019-10-28
Attending: OTOLARYNGOLOGY
Payer: COMMERCIAL

## 2019-10-28 DIAGNOSIS — R59.0 ENLARGED LYMPH NODE IN NECK: ICD-10-CM

## 2019-10-28 PROCEDURE — 76536 US EXAM OF HEAD AND NECK: CPT

## 2020-03-24 ENCOUNTER — TELEPHONE (OUTPATIENT)
Dept: FAMILY MEDICINE CLINIC | Facility: OTHER | Age: 45
End: 2020-03-24

## 2020-03-24 ENCOUNTER — TELEMEDICINE (OUTPATIENT)
Dept: FAMILY MEDICINE CLINIC | Facility: OTHER | Age: 45
End: 2020-03-24
Payer: COMMERCIAL

## 2020-03-24 DIAGNOSIS — J06.9 VIRAL URI: Primary | ICD-10-CM

## 2020-03-24 DIAGNOSIS — R19.7 DIARRHEA, UNSPECIFIED TYPE: ICD-10-CM

## 2020-03-24 PROCEDURE — 99212 OFFICE O/P EST SF 10 MIN: CPT | Performed by: FAMILY MEDICINE

## 2020-03-24 NOTE — TELEPHONE ENCOUNTER
Work fax number was received and sent to [employer]       - Message from Elyse Ganser, MD sent at 3/24/2020  3:32 PM EDT -----  Please call patient and obtain work fax number  There is work excuse letter that needs to be faxed to his work  Patient expecting a call from staff today  Thanks  Dr Walls

## 2020-03-24 NOTE — LETTER
March 24, 2020     Patient: Sarai Obrien   YOB: 1975   Date of Visit: 3/24/2020       To Whom it May Concern:    Sarai Obrien is under my professional care  He was seen in my office on 3/24/2020  Please excuse him from work from 3/24/20 to 3/27/20  He may return to work on 3/30/20 if asymptomatic  If you have any questions or concerns, please don't hesitate to call           Sincerely,          Ba Gill MD        CC: No Recipients

## 2020-03-24 NOTE — PROGRESS NOTES
Virtual Regular Visit    Problem List Items Addressed This Visit     None      Visit Diagnoses     Viral URI    -  Primary    Diarrhea, unspecified type            Advised patient to stay home  Will provide him with work excuse note and fax to his work  He will watch for signs of worsening of cough or fever and sob and if any develop he will call  He is advised to check Temp daily and if he develops temp of 100 4 or greater to call the office for consideration of COVID-19 testing  I advised him that it appears to be viral illness and he should take tylenol prn as well as stay hydrated with electrolyte drinks and ensure adequate po intake and rest     He will call with any concerns or worsening  Hygiene measures re-enforced and he was advised to follow the social distancing recommendations until better  Patient is agreeable to the plan of care  Reason for visit is due to diarrhea and sore throat  Encounter provider Kizzy Mathews MD    Provider located at [place] [address]      Recent Visits  No visits were found meeting these conditions  Showing recent visits within past 7 days and meeting all other requirements     Future Appointments  No visits were found meeting these conditions  Showing future appointments within next 150 days and meeting all other requirements        After connecting through [employer], the patient was identified by name and date of birth  Misty Enriquez was informed that this is a telemedicine visit and that the visit is being conducted through telephone which may not be secure and therefore, might not be HIPAA-compliant  My office door was closed  No one else was in the room  He acknowledged consent and understanding of privacy and security of the video platform  The patient has agreed to participate and understands they can discontinue the visit at any time  Subjective  Misty Enriquez is a 38 y o  male    He feels mild sore throat and loose stools twice since yesterday  He has no fever or chills and has mild cough that is more of a throat irritation  His wife is sick with similar symptoms  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  He is not sure about exposure but no sick contact or travel history  He feels tired and so he didn't go to work today and wants to know what to do next  He is able to eat and drink ok and doesn't have any vomiting  Past Medical History:   Diagnosis Date    Kidney stones     Tinnitus        Past Surgical History:   Procedure Laterality Date    NO PAST SURGERIES         Current Outpatient Medications   Medication Sig Dispense Refill    famotidine (PEPCID) 40 MG tablet TAKE 1 TABLET (40 MG TOTAL) BY MOUTH DAILY AT BEDTIME 90 tablet 0    pantoprazole (PROTONIX) 40 mg tablet TAKE 1 TABLET BY MOUTH EVERY DAY 90 tablet 0    ranitidine (ZANTAC) 300 MG tablet Take 1 tablet (300 mg total) by mouth daily at bedtime for 30 days (Patient not taking: Reported on 8/5/2019) 30 tablet 6     No current facility-administered medications for this visit  No Known Allergies    Review of Systems   Constitutional: Positive for fatigue  Negative for appetite change, chills and fever  HENT: Positive for congestion and sore throat (mild)  Negative for trouble swallowing  Respiratory: Positive for cough (mild)  Negative for shortness of breath and wheezing  Cardiovascular: Negative for chest pain  Gastrointestinal: Positive for diarrhea  Negative for abdominal pain, nausea and vomiting  Musculoskeletal: Negative for arthralgias and myalgias  I spent 9 minutes with the patient during this visit

## 2020-03-24 NOTE — TELEPHONE ENCOUNTER
This number was for the staff to fax the work excuse note from today's visit to his work  Please fax the note and inform the patient  Thanks

## 2020-04-05 ENCOUNTER — NURSE TRIAGE (OUTPATIENT)
Dept: OTHER | Facility: OTHER | Age: 45
End: 2020-04-05

## 2020-04-05 ENCOUNTER — TELEPHONE (OUTPATIENT)
Dept: OTHER | Facility: OTHER | Age: 45
End: 2020-04-05

## 2020-04-05 ENCOUNTER — TELEMEDICINE (OUTPATIENT)
Dept: FAMILY MEDICINE CLINIC | Facility: CLINIC | Age: 45
End: 2020-04-05
Payer: COMMERCIAL

## 2020-04-05 DIAGNOSIS — R53.81 MALAISE: ICD-10-CM

## 2020-04-05 DIAGNOSIS — Z20.828 EXPOSURE TO SARS-ASSOCIATED CORONAVIRUS: Primary | ICD-10-CM

## 2020-04-05 DIAGNOSIS — R05.9 COUGH: ICD-10-CM

## 2020-04-05 DIAGNOSIS — R50.9 FEVER, LOW GRADE: ICD-10-CM

## 2020-04-05 PROCEDURE — 99213 OFFICE O/P EST LOW 20 MIN: CPT | Performed by: FAMILY MEDICINE

## 2020-04-06 ENCOUNTER — TELEPHONE (OUTPATIENT)
Dept: FAMILY MEDICINE CLINIC | Facility: OTHER | Age: 45
End: 2020-04-06

## 2020-04-06 DIAGNOSIS — Z20.828 EXPOSURE TO SARS-ASSOCIATED CORONAVIRUS: ICD-10-CM

## 2020-04-06 PROCEDURE — 87635 SARS-COV-2 COVID-19 AMP PRB: CPT

## 2020-04-07 ENCOUNTER — TELEPHONE (OUTPATIENT)
Dept: FAMILY MEDICINE CLINIC | Facility: OTHER | Age: 45
End: 2020-04-07

## 2020-04-07 ENCOUNTER — TELEMEDICINE (OUTPATIENT)
Dept: FAMILY MEDICINE CLINIC | Facility: OTHER | Age: 45
End: 2020-04-07
Payer: COMMERCIAL

## 2020-04-07 DIAGNOSIS — U07.1 ACUTE RESPIRATORY DISEASE DUE TO COVID-19 VIRUS: Primary | ICD-10-CM

## 2020-04-07 DIAGNOSIS — R05.9 COUGH: ICD-10-CM

## 2020-04-07 DIAGNOSIS — J06.9 ACUTE RESPIRATORY DISEASE DUE TO COVID-19 VIRUS: Primary | ICD-10-CM

## 2020-04-07 LAB — SARS-COV-2 RNA SPEC QL NAA+PROBE: DETECTED

## 2020-04-07 PROCEDURE — 99213 OFFICE O/P EST LOW 20 MIN: CPT | Performed by: FAMILY MEDICINE

## 2020-04-07 RX ORDER — BENZONATATE 200 MG/1
200 CAPSULE ORAL 3 TIMES DAILY PRN
Qty: 20 CAPSULE | Refills: 0 | Status: SHIPPED | OUTPATIENT
Start: 2020-04-07 | End: 2020-04-23 | Stop reason: SDUPTHER

## 2020-04-08 ENCOUNTER — TELEMEDICINE (OUTPATIENT)
Dept: FAMILY MEDICINE CLINIC | Facility: OTHER | Age: 45
End: 2020-04-08
Payer: COMMERCIAL

## 2020-04-08 VITALS — TEMPERATURE: 97.7 F

## 2020-04-08 DIAGNOSIS — R05.9 COUGH: ICD-10-CM

## 2020-04-08 DIAGNOSIS — J06.9 ACUTE RESPIRATORY DISEASE DUE TO COVID-19 VIRUS: Primary | ICD-10-CM

## 2020-04-08 DIAGNOSIS — U07.1 ACUTE RESPIRATORY DISEASE DUE TO COVID-19 VIRUS: Primary | ICD-10-CM

## 2020-04-08 PROCEDURE — 99214 OFFICE O/P EST MOD 30 MIN: CPT | Performed by: FAMILY MEDICINE

## 2020-04-09 ENCOUNTER — TELEMEDICINE (OUTPATIENT)
Dept: FAMILY MEDICINE CLINIC | Facility: OTHER | Age: 45
End: 2020-04-09
Payer: COMMERCIAL

## 2020-04-09 DIAGNOSIS — U07.1 COVID-19 VIRUS INFECTION: Primary | ICD-10-CM

## 2020-04-09 DIAGNOSIS — Z11.4 SCREENING FOR HIV (HUMAN IMMUNODEFICIENCY VIRUS): ICD-10-CM

## 2020-04-09 PROCEDURE — 99213 OFFICE O/P EST LOW 20 MIN: CPT | Performed by: NURSE PRACTITIONER

## 2020-04-10 ENCOUNTER — TELEMEDICINE (OUTPATIENT)
Dept: FAMILY MEDICINE CLINIC | Facility: OTHER | Age: 45
End: 2020-04-10
Payer: COMMERCIAL

## 2020-04-10 ENCOUNTER — TELEPHONE (OUTPATIENT)
Dept: FAMILY MEDICINE CLINIC | Facility: OTHER | Age: 45
End: 2020-04-10

## 2020-04-10 DIAGNOSIS — U07.1 COVID-19 VIRUS INFECTION: Primary | ICD-10-CM

## 2020-04-10 PROCEDURE — 99213 OFFICE O/P EST LOW 20 MIN: CPT | Performed by: NURSE PRACTITIONER

## 2020-04-11 ENCOUNTER — OFFICE VISIT (OUTPATIENT)
Dept: FAMILY MEDICINE CLINIC | Facility: CLINIC | Age: 45
End: 2020-04-11
Payer: COMMERCIAL

## 2020-04-11 VITALS — TEMPERATURE: 98.3 F

## 2020-04-11 DIAGNOSIS — U07.1 COVID-19 VIRUS INFECTION: Primary | ICD-10-CM

## 2020-04-11 PROCEDURE — 99214 OFFICE O/P EST MOD 30 MIN: CPT | Performed by: NURSE PRACTITIONER

## 2020-04-11 PROCEDURE — 1036F TOBACCO NON-USER: CPT | Performed by: NURSE PRACTITIONER

## 2020-04-13 ENCOUNTER — TELEMEDICINE (OUTPATIENT)
Dept: FAMILY MEDICINE CLINIC | Facility: OTHER | Age: 45
End: 2020-04-13
Payer: COMMERCIAL

## 2020-04-13 VITALS — TEMPERATURE: 97.4 F

## 2020-04-13 DIAGNOSIS — U07.1 COVID-19 VIRUS INFECTION: Primary | ICD-10-CM

## 2020-04-13 PROCEDURE — 99213 OFFICE O/P EST LOW 20 MIN: CPT | Performed by: FAMILY MEDICINE

## 2020-04-14 ENCOUNTER — TELEMEDICINE (OUTPATIENT)
Dept: FAMILY MEDICINE CLINIC | Facility: OTHER | Age: 45
End: 2020-04-14
Payer: COMMERCIAL

## 2020-04-14 VITALS — TEMPERATURE: 97.7 F

## 2020-04-14 DIAGNOSIS — U07.1 COVID-19 VIRUS INFECTION: Primary | ICD-10-CM

## 2020-04-14 PROCEDURE — 99212 OFFICE O/P EST SF 10 MIN: CPT | Performed by: FAMILY MEDICINE

## 2020-04-15 ENCOUNTER — TELEMEDICINE (OUTPATIENT)
Dept: FAMILY MEDICINE CLINIC | Facility: OTHER | Age: 45
End: 2020-04-15
Payer: COMMERCIAL

## 2020-04-15 DIAGNOSIS — U07.1 COVID-19 VIRUS INFECTION: Primary | ICD-10-CM

## 2020-04-15 PROCEDURE — 99214 OFFICE O/P EST MOD 30 MIN: CPT | Performed by: FAMILY MEDICINE

## 2020-04-17 ENCOUNTER — TELEMEDICINE (OUTPATIENT)
Dept: FAMILY MEDICINE CLINIC | Facility: OTHER | Age: 45
End: 2020-04-17
Payer: COMMERCIAL

## 2020-04-17 DIAGNOSIS — U07.1 COVID-19 VIRUS INFECTION: Primary | ICD-10-CM

## 2020-04-17 PROCEDURE — 99212 OFFICE O/P EST SF 10 MIN: CPT | Performed by: NURSE PRACTITIONER

## 2020-04-20 ENCOUNTER — TELEPHONE (OUTPATIENT)
Dept: FAMILY MEDICINE CLINIC | Facility: OTHER | Age: 45
End: 2020-04-20

## 2020-04-20 ENCOUNTER — TELEMEDICINE (OUTPATIENT)
Dept: FAMILY MEDICINE CLINIC | Facility: OTHER | Age: 45
End: 2020-04-20
Payer: COMMERCIAL

## 2020-04-20 VITALS — TEMPERATURE: 97 F

## 2020-04-20 DIAGNOSIS — U07.1 COVID-19 VIRUS INFECTION: Primary | ICD-10-CM

## 2020-04-20 PROCEDURE — 99213 OFFICE O/P EST LOW 20 MIN: CPT | Performed by: FAMILY MEDICINE

## 2020-04-22 ENCOUNTER — TELEMEDICINE (OUTPATIENT)
Dept: FAMILY MEDICINE CLINIC | Facility: OTHER | Age: 45
End: 2020-04-22
Payer: COMMERCIAL

## 2020-04-22 DIAGNOSIS — U07.1 COVID-19 VIRUS INFECTION: Primary | ICD-10-CM

## 2020-04-22 PROCEDURE — 99212 OFFICE O/P EST SF 10 MIN: CPT | Performed by: FAMILY MEDICINE

## 2020-04-23 ENCOUNTER — OFFICE VISIT (OUTPATIENT)
Dept: FAMILY MEDICINE CLINIC | Facility: CLINIC | Age: 45
End: 2020-04-23
Payer: COMMERCIAL

## 2020-04-23 ENCOUNTER — TELEMEDICINE (OUTPATIENT)
Dept: FAMILY MEDICINE CLINIC | Facility: OTHER | Age: 45
End: 2020-04-23
Payer: COMMERCIAL

## 2020-04-23 VITALS — OXYGEN SATURATION: 97 % | TEMPERATURE: 99 F

## 2020-04-23 DIAGNOSIS — R06.00 DOE (DYSPNEA ON EXERTION): ICD-10-CM

## 2020-04-23 DIAGNOSIS — U07.1 COVID-19 VIRUS INFECTION: Primary | ICD-10-CM

## 2020-04-23 DIAGNOSIS — M54.6 ACUTE BILATERAL THORACIC BACK PAIN: ICD-10-CM

## 2020-04-23 DIAGNOSIS — R53.83 OTHER FATIGUE: ICD-10-CM

## 2020-04-23 DIAGNOSIS — J06.9 ACUTE RESPIRATORY DISEASE DUE TO COVID-19 VIRUS: ICD-10-CM

## 2020-04-23 DIAGNOSIS — R05.9 COUGH: ICD-10-CM

## 2020-04-23 DIAGNOSIS — R19.7 DIARRHEA OF PRESUMED INFECTIOUS ORIGIN: ICD-10-CM

## 2020-04-23 DIAGNOSIS — U07.1 ACUTE RESPIRATORY DISEASE DUE TO COVID-19 VIRUS: ICD-10-CM

## 2020-04-23 DIAGNOSIS — R07.89 CHEST TIGHTNESS: ICD-10-CM

## 2020-04-23 DIAGNOSIS — R42 LIGHTHEADEDNESS: ICD-10-CM

## 2020-04-23 PROCEDURE — 99215 OFFICE O/P EST HI 40 MIN: CPT | Performed by: PHYSICIAN ASSISTANT

## 2020-04-23 PROCEDURE — 99213 OFFICE O/P EST LOW 20 MIN: CPT | Performed by: NURSE PRACTITIONER

## 2020-04-23 PROCEDURE — 1036F TOBACCO NON-USER: CPT | Performed by: PHYSICIAN ASSISTANT

## 2020-04-23 RX ORDER — BENZONATATE 200 MG/1
200 CAPSULE ORAL 3 TIMES DAILY PRN
Qty: 20 CAPSULE | Refills: 0 | Status: SHIPPED | OUTPATIENT
Start: 2020-04-23 | End: 2020-04-24 | Stop reason: DRUGHIGH

## 2020-04-23 RX ORDER — ALBUTEROL SULFATE 90 UG/1
2 AEROSOL, METERED RESPIRATORY (INHALATION) EVERY 6 HOURS PRN
Qty: 1 INHALER | Refills: 0 | Status: SHIPPED | OUTPATIENT
Start: 2020-04-23 | End: 2020-04-24 | Stop reason: SDUPTHER

## 2020-04-24 DIAGNOSIS — U07.1 ACUTE RESPIRATORY DISEASE DUE TO COVID-19 VIRUS: ICD-10-CM

## 2020-04-24 DIAGNOSIS — U07.1 COVID-19 VIRUS INFECTION: ICD-10-CM

## 2020-04-24 DIAGNOSIS — R05.9 COUGH: ICD-10-CM

## 2020-04-24 DIAGNOSIS — J06.9 ACUTE RESPIRATORY DISEASE DUE TO COVID-19 VIRUS: ICD-10-CM

## 2020-04-24 DIAGNOSIS — R05.9 COUGH: Primary | ICD-10-CM

## 2020-04-24 RX ORDER — BENZONATATE 200 MG/1
CAPSULE ORAL
Qty: 20 CAPSULE | Refills: 0 | OUTPATIENT
Start: 2020-04-24

## 2020-04-24 RX ORDER — BENZONATATE 100 MG/1
200 CAPSULE ORAL 3 TIMES DAILY PRN
Qty: 20 CAPSULE | Refills: 0 | Status: SHIPPED | OUTPATIENT
Start: 2020-04-24 | End: 2020-08-21

## 2020-04-30 ENCOUNTER — TELEMEDICINE (OUTPATIENT)
Dept: FAMILY MEDICINE CLINIC | Facility: OTHER | Age: 45
End: 2020-04-30
Payer: COMMERCIAL

## 2020-04-30 DIAGNOSIS — U07.1 COVID-19 VIRUS INFECTION: Primary | ICD-10-CM

## 2020-04-30 PROCEDURE — 99213 OFFICE O/P EST LOW 20 MIN: CPT | Performed by: NURSE PRACTITIONER

## 2020-05-13 ENCOUNTER — TELEPHONE (OUTPATIENT)
Dept: FAMILY MEDICINE CLINIC | Facility: OTHER | Age: 45
End: 2020-05-13

## 2020-06-01 ENCOUNTER — TELEPHONE (OUTPATIENT)
Dept: FAMILY MEDICINE CLINIC | Facility: OTHER | Age: 45
End: 2020-06-01

## 2020-08-21 ENCOUNTER — OFFICE VISIT (OUTPATIENT)
Dept: URGENT CARE | Facility: CLINIC | Age: 45
End: 2020-08-21
Payer: COMMERCIAL

## 2020-08-21 VITALS
TEMPERATURE: 97.9 F | OXYGEN SATURATION: 98 % | WEIGHT: 164 LBS | RESPIRATION RATE: 16 BRPM | BODY MASS INDEX: 27.32 KG/M2 | SYSTOLIC BLOOD PRESSURE: 148 MMHG | DIASTOLIC BLOOD PRESSURE: 90 MMHG | HEART RATE: 78 BPM | HEIGHT: 65 IN

## 2020-08-21 DIAGNOSIS — K12.0 CANKER SORES ORAL: Primary | ICD-10-CM

## 2020-08-21 PROCEDURE — S9083 URGENT CARE CENTER GLOBAL: HCPCS | Performed by: NURSE PRACTITIONER

## 2020-08-21 PROCEDURE — G0382 LEV 3 HOSP TYPE B ED VISIT: HCPCS | Performed by: NURSE PRACTITIONER

## 2020-08-21 NOTE — LETTER
August 21, 2020     Patient: Sommer Sorenson   YOB: 1975   Date of Visit: 8/21/2020       To Whom it May Concern:    Sommer Sorenson was seen in my clinic on 8/21/2020  He may return to work on 8/22/2020  If you have any questions or concerns, please don't hesitate to call           Sincerely,          PASHA Polanco       CC: Sommer Acostaven

## 2020-08-21 NOTE — PATIENT INSTRUCTIONS
Use mouthwash every 6 hours as needed   Avoid food that are acid, spicy, or citrus   Follow up with your dentist as needed    Úlceras aftosas   LO QUE NECESITA SABER:   Úlceras aftosas o aftas son pequeñas úlceras que crecen dentro de stevens boca  Ulceras son heridas abiertas que pueden ser superficiales o profundas  Usted puede tener deborah o mas aftas al [facility], y estas pueden crecer en grupos  INSTRUCCIONES SOBRE EL KIKI HOSPITALARIA:   Regrese a la larisa de emergencias si:   · Si usted no puede comer o beber por el dolor en stevens boca  Pregúntele a stevens Maxcine Wily vitaminas y minerales son adecuados para usted  · Luz Elena úlceras aftosas no wade desaparecido después de 3 a 4 semanas  · Stevens dolor no desaparece después de shayla medicamentos  · Luz Elena úlceras empeoran o usted esta desarrollando mas úlceras, aun después del tratamiento  · Usted tiene preguntas o inquietudes acerca de stevens condición o cuidado  Medicamentos: Es posible que usted necesite alguno de los siguientes:  · Los analgésicos  pueden administrarse en crema, gel o enjuague bucal     · Medicamentos esteroideos  pueden administrarse para reducir el enrojecimiento, el dolor y la hinchazón  · Barrett luz elena medicamentos steve se le haya indicado  Consulte con stevens médico si usted bernard que stevens medicamento no le está ayudando o si presenta efectos secundarios  Infórmele si es alérgico a cualquier medicamento  Mantenga deborah lista actualizada de los [place], las vitaminas y los productos herbales que salome  Incluya los siguientes datos de los medicamentos: cantidad, frecuencia y motivo de administración  Traiga con usted la lista o los envases de la píldoras a luz elena citas de seguimiento  Lleve la lista de los medicamentos con usted en mauri de deborah emergencia  Acuda a luz elena consultas de control con stevens médico según le indicaron  Anote luz elena preguntas para que se acuerde de hacerlas allison luz elena visitas     Coma alimentos blandos y simples hasta que sanen las [place] aftosas  Por ejemplo, yogur, huevos y sopas cremosas  Puede que necesite cambiar algunos alimentos que normalmente come  No coma alimentos crujientes, secos o salados, tales steve pan sabra seco, palomitas de maíz o chips  Estos pueden causarle dolor  No consuma alimentos o bebidas que tengan cítricos steve toronjas, jugo de [place], milo y [place]  Estos alimentos pueden empeorar el dolor o causar la formación de [place]  Cuidado bucal:  cuidadosamente cepille boaz dientes y castrejon lengua todos los días  Use un cepillo de dientes suave  Si usted tiene dentaduras, [employer]  Si boaz omero o dentaduras no se sienten cómodos, eun que [place] Last revise para asegurarse que tengan el ajuste correcto  © 2017 [address] [employer] Information is for End User's use only and may not be sold, redistributed or otherwise used for commercial purposes  All illustrations and images included in CareNotes® are the copyrighted property of [employer]  or Mo Ferrell  Esta información es sólo para uso en educación  Castrejon intención no es darle un consejo médico sobre enfermedades o tratamientos  Colsulte con castrejon Anderson Gris farmacéutico antes de seguir cualquier régimen médico para saber si es seguro y efectivo para usted

## 2020-08-22 NOTE — PROGRESS NOTES
[facility]        NAME: Tosha Sotomayor is a 41 y o  male  : 1975    MRN: [id number]  DATE: 2020  TIME: 9:16 AM    Assessment and Plan   Canker sores oral [K12 0]  1  Canker sores oral  al mag oxide-diphenhydramine-lidocaine viscous (MAGIC MOUTHWASH) 1:1:1 suspension         Patient Instructions   Use mouthwash every 6 hours as needed   Avoid food that are acid, spicy, or citrus   Follow up with your dentist as needed    Follow up with PCP in 3-5 days  Proceed to  ER if symptoms worsen  Chief Complaint     Chief Complaint   Patient presents with    Swollen Glands     left side x 5-6 days, white under tongue         History of Present Illness       Patient is a 41year old male presenting with a sore on the floor on his mouth on the left side  He also reports left gland swelling  Denies fever, chills, SOB, or cough  He has a history or oral sore  He relates it to a change in toothpaste  Review of Systems   Review of Systems   Constitutional: Negative for activity change, chills, fatigue and fever  HENT: Positive for mouth sores  Negative for congestion, ear discharge, sneezing, sore throat, tinnitus and voice change  Respiratory: Negative for cough  Gastrointestinal: Negative for abdominal pain, diarrhea, nausea and vomiting  Skin: Negative for rash  Hematological: Positive for adenopathy           Current Medications       Current Outpatient Medications:     al mag oxide-diphenhydramine-lidocaine viscous (MAGIC MOUTHWASH) 1:1:1 suspension, Swish and spit 10 mL every 6 (six) hours as needed for mouth pain or discomfort for up to 5 days, Disp: 200 mL, Rfl: 0    albuterol (PROVENTIL HFA,VENTOLIN HFA) 90 mcg/act inhaler, Inhale 2 puffs every 6 (six) hours as needed for wheezing or shortness of breath, Disp: 1 Inhaler, Rfl: 0    famotidine (PEPCID) 40 MG tablet, TAKE 1 TABLET (40 MG TOTAL) BY MOUTH DAILY AT BEDTIME (Patient not taking: Reported on 2020), Disp: 90 tablet, Rfl: 0    pantoprazole (PROTONIX) 40 mg tablet, TAKE 1 TABLET BY MOUTH EVERY DAY (Patient not taking: Reported on 4/23/2020), Disp: 90 tablet, Rfl: 0    Current Allergies     Allergies as of 08/21/2020    (No Known Allergies)            The following portions of the patient's history were reviewed and updated as appropriate: allergies, current medications, past family history, past medical history, past social history, past surgical history and problem list      Past Medical History:   Diagnosis Date    Kidney stones     Tinnitus        Past Surgical History:   Procedure Laterality Date    NO PAST SURGERIES         Family History   Problem Relation Age of Onset    Colon cancer Mother     Stomach cancer Mother     No Known Problems Father     Diabetes Maternal Grandmother     Heart attack Maternal Grandfather     Diabetes Other     Cancer Other          Medications have been verified  Objective   /90   Pulse 78   Temp 97 9 °F (36 6 °C)   Resp 16   Ht 5' 5" (1 651 m)   Wt 74 4 kg (164 lb)   SpO2 98%   BMI 27 29 kg/m²        Physical Exam     Physical Exam  Vitals signs reviewed  Constitutional:       General: He is awake  Appearance: Normal appearance  He is normal weight  HENT:      Head: Normocephalic  Nose: Nose normal       Mouth/Throat:      Lips: Pink  Cardiovascular:      Rate and Rhythm: Normal rate and regular rhythm  Pulmonary:      Effort: Pulmonary effort is normal    Lymphadenopathy:      Head:      Left side of head: Submandibular adenopathy present  Skin:     General: Skin is warm and moist    Neurological:      General: No focal deficit present  Mental Status: He is alert, oriented to person, place, and time and easily aroused  Psychiatric:         Behavior: Behavior is cooperative

## 2020-10-30 ENCOUNTER — OFFICE VISIT (OUTPATIENT)
Dept: URGENT CARE | Facility: CLINIC | Age: 45
End: 2020-10-30
Payer: COMMERCIAL

## 2020-10-30 VITALS
DIASTOLIC BLOOD PRESSURE: 79 MMHG | TEMPERATURE: 97.8 F | WEIGHT: 166 LBS | OXYGEN SATURATION: 100 % | SYSTOLIC BLOOD PRESSURE: 144 MMHG | HEART RATE: 59 BPM | BODY MASS INDEX: 30.55 KG/M2 | RESPIRATION RATE: 20 BRPM | HEIGHT: 62 IN

## 2020-10-30 DIAGNOSIS — R07.89 CHEST WALL PAIN: Primary | ICD-10-CM

## 2020-10-30 PROCEDURE — 99213 OFFICE O/P EST LOW 20 MIN: CPT | Performed by: NURSE PRACTITIONER

## 2020-10-30 PROCEDURE — 93005 ELECTROCARDIOGRAM TRACING: CPT | Performed by: NURSE PRACTITIONER

## 2020-10-30 RX ORDER — PREDNISONE 10 MG/1
TABLET ORAL
Qty: 12 TABLET | Refills: 0 | Status: SHIPPED | OUTPATIENT
Start: 2020-10-30 | End: 2021-09-08

## 2020-10-30 RX ORDER — MELOXICAM 15 MG/1
15 TABLET ORAL DAILY
Qty: 15 TABLET | Refills: 0 | Status: SHIPPED | OUTPATIENT
Start: 2020-10-30 | End: 2021-09-08

## 2020-11-02 LAB
ATRIAL RATE: 56 BPM
P AXIS: 67 DEGREES
PR INTERVAL: 174 MS
QRS AXIS: 65 DEGREES
QRSD INTERVAL: 88 MS
QT INTERVAL: 398 MS
QTC INTERVAL: 384 MS
T WAVE AXIS: 31 DEGREES
VENTRICULAR RATE: 56 BPM

## 2020-11-02 PROCEDURE — 93010 ELECTROCARDIOGRAM REPORT: CPT | Performed by: INTERNAL MEDICINE

## 2021-04-02 ENCOUNTER — OFFICE VISIT (OUTPATIENT)
Dept: FAMILY MEDICINE CLINIC | Facility: OTHER | Age: 46
End: 2021-04-02
Payer: COMMERCIAL

## 2021-04-02 VITALS
HEART RATE: 74 BPM | TEMPERATURE: 97.8 F | HEIGHT: 62 IN | BODY MASS INDEX: 29.51 KG/M2 | SYSTOLIC BLOOD PRESSURE: 120 MMHG | OXYGEN SATURATION: 97 % | WEIGHT: 160.38 LBS | DIASTOLIC BLOOD PRESSURE: 70 MMHG

## 2021-04-02 DIAGNOSIS — Z13.29 SCREENING FOR THYROID DISORDER: ICD-10-CM

## 2021-04-02 DIAGNOSIS — K21.9 LARYNGOPHARYNGEAL REFLUX (LPR): ICD-10-CM

## 2021-04-02 DIAGNOSIS — Z13.220 SCREENING FOR LIPID DISORDERS: ICD-10-CM

## 2021-04-02 DIAGNOSIS — Z13.1 SCREENING FOR DIABETES MELLITUS: ICD-10-CM

## 2021-04-02 DIAGNOSIS — H61.23 BILATERAL IMPACTED CERUMEN: ICD-10-CM

## 2021-04-02 DIAGNOSIS — R53.83 FATIGUE, UNSPECIFIED TYPE: ICD-10-CM

## 2021-04-02 DIAGNOSIS — K21.9 GASTROESOPHAGEAL REFLUX DISEASE: ICD-10-CM

## 2021-04-02 DIAGNOSIS — Z00.00 ANNUAL PHYSICAL EXAM: Primary | ICD-10-CM

## 2021-04-02 PROBLEM — K21.00 GASTROESOPHAGEAL REFLUX DISEASE WITH ESOPHAGITIS: Status: ACTIVE | Noted: 2021-04-02

## 2021-04-02 PROCEDURE — 3725F SCREEN DEPRESSION PERFORMED: CPT | Performed by: FAMILY MEDICINE

## 2021-04-02 PROCEDURE — 1036F TOBACCO NON-USER: CPT | Performed by: FAMILY MEDICINE

## 2021-04-02 PROCEDURE — 99396 PREV VISIT EST AGE 40-64: CPT | Performed by: FAMILY MEDICINE

## 2021-04-02 PROCEDURE — 3008F BODY MASS INDEX DOCD: CPT | Performed by: FAMILY MEDICINE

## 2021-04-02 RX ORDER — PANTOPRAZOLE SODIUM 40 MG/1
40 TABLET, DELAYED RELEASE ORAL DAILY
Qty: 90 TABLET | Refills: 0 | Status: SHIPPED | OUTPATIENT
Start: 2021-04-02 | End: 2021-08-12 | Stop reason: SDUPTHER

## 2021-04-02 RX ORDER — ALUMINUM HYDROXIDE AND MAGNESIUM CARBONATE 95; 358 MG/15ML; MG/15ML
15 LIQUID ORAL
Qty: 1 BOTTLE | Refills: 0 | Status: SHIPPED | OUTPATIENT
Start: 2021-04-02 | End: 2021-09-08

## 2021-04-02 RX ORDER — FAMOTIDINE 40 MG/1
40 TABLET, FILM COATED ORAL
Qty: 90 TABLET | Refills: 0 | Status: SHIPPED | OUTPATIENT
Start: 2021-04-02 | End: 2021-08-12 | Stop reason: SDUPTHER

## 2021-04-02 NOTE — PROGRESS NOTES
Assessment/Plan:       Diagnoses and all orders for this visit:    Laryngopharyngeal reflux (LPR)  -  Patient with worsening reflux x 6months, reports he did stop using all reflux medications during this time as symptoms improved  - Discussed importance of continued use of reflux meds even when symptoms improve, will de-escalate meds once acute worsening has improved to decipher best long term regimen as patient does not want to be on medications long term  - Will consider EGD if no improvement with pharmcotherapy  - pantoprazole (PROTONIX) 40 mg tablet; Take 1 tablet (40 mg total) by mouth daily    Gastroesophageal reflux disease  -  Discussed dietary modifications including avoiding coffee, chocolates, and spicy foods  Remainder of plan as above  - famotidine (PEPCID) 40 MG tablet; Take 1 tablet (40 mg total) by mouth daily at bedtime  -     aluminum hydroxide-magnesium carbonate (Gaviscon)  mg/15 mL oral suspension; Take 15 mL by mouth 3 (three) times daily after meals    Bilateral impacted cerumen  -     carbamide peroxide (DEBROX) 6 5 % otic solution; Administer 5 drops into both ears 2 (two) times a day        Subjective:      Patient ID: Sommer Sorenson is a 40 y o  male  Heartburn  He complains of abdominal pain, belching and heartburn  He reports no chest pain, no coughing, no dysphagia, no early satiety, no globus sensation, no hoarse voice or no nausea  This is a chronic problem  The current episode started more than 1 year ago  The problem occurs constantly  The problem has been gradually worsening  The heartburn duration is more than one hour  The heartburn is located in the substernum and abdomen  The heartburn is of severe intensity  The heartburn does not wake him from sleep  The heartburn does not limit his activity  The heartburn doesn't change with position  The symptoms are aggravated by caffeine  Associated symptoms include fatigue  Pertinent negatives include no melena or weight loss  There are no known risk factors (Patient specifically denies any chronic NSAID use or Iron supplementation)  He has tried a PPI, a histamine-2 antagonist, an antacid and a diet change (Patient reports he was previously taking PPI, [id number], and followed diet changes but stoppe about 6 months ago due to overall improvement in symptoms ) for the symptoms  The treatment provided significant relief  The following portions of the patient's history were reviewed and updated as appropriate: allergies, current medications, past family history, past medical history, past social history, past surgical history and problem list     Review of Systems   Constitutional: Positive for fatigue  Negative for appetite change, fever, unexpected weight change and weight loss  HENT: Negative for hoarse voice  Respiratory: Negative for cough  Cardiovascular: Negative for chest pain  Gastrointestinal: Positive for abdominal pain and heartburn  Negative for anal bleeding, blood in stool, constipation, diarrhea, dysphagia, melena, nausea and vomiting  Genitourinary: Negative for dysuria  Neurological: Negative for headaches  Hematological: Does not bruise/bleed easily  Objective:      /70 (BP Location: Left arm, Patient Position: Sitting, Cuff Size: Adult)   Pulse 74   Temp 97 8 °F (36 6 °C) (Temporal)   Ht 5' 1 5" (1 562 m)   Wt 72 7 kg (160 lb 6 oz)   SpO2 97%   BMI 29 81 kg/m²          Physical Exam  Constitutional:       Appearance: He is well-developed  HENT:      Head: Normocephalic and atraumatic  Right Ear: There is impacted cerumen  Left Ear: There is impacted cerumen  Nose: Nose normal       Mouth/Throat:      Mouth: Mucous membranes are moist       Pharynx: Oropharynx is clear  Eyes:      Conjunctiva/sclera: Conjunctivae normal    Neck:      Musculoskeletal: Normal range of motion and neck supple  Cardiovascular:      Rate and Rhythm: Normal rate and regular rhythm  Heart sounds: Normal heart sounds  Pulmonary:      Effort: Pulmonary effort is normal       Breath sounds: Normal breath sounds  Abdominal:      General: Bowel sounds are normal  There is no distension  Palpations: Abdomen is soft  There is no mass  Tenderness: There is no abdominal tenderness  Musculoskeletal: Normal range of motion  Skin:     General: Skin is warm and dry  Neurological:      Mental Status: He is alert and oriented to person, place, and time  Psychiatric:         Mood and Affect: Mood normal          Thought Content: Thought content normal        The patient indicates understanding of these issues and agrees with the plan    Hung Crystal MD

## 2021-04-02 NOTE — PROGRESS NOTES
ADULT ANNUAL [address]  [facility]    NAME: Sommer Sorenson  AGE: 40 y o  SEX: male  : 1975     DATE: 2021     Assessment and Plan:     Problem List Items Addressed This Visit          Other Visit Diagnoses     Annual physical exam    -  Primary    Relevant Orders    CBC and Platelet    Screening for diabetes mellitus        Relevant Orders    Comprehensive metabolic panel    HEMOGLOBIN A1C W/ EAG ESTIMATION    Screening for thyroid disorder        Relevant Orders    TSH, 3rd generation with Free T4 reflex    Screening for lipid disorders        Relevant Orders    Lipid panel    Fatigue, unspecified type        Relevant Orders    Vitamin B12    Iron Panel (Includes Ferritin, Iron Sat%, Iron, and TIBC)    Bilateral impacted cerumen        Relevant Medications    carbamide peroxide (DEBROX) 6 5 % otic solution          Immunizations and preventive care screenings were discussed with patient today  Appropriate education was printed on patient's after visit summary  Counseling:  Alcohol/drug use: discussed moderation in alcohol intake, the recommendations for healthy alcohol use, and avoidance of illicit drug use  Dental Health: discussed importance of regular tooth brushing, flossing, and dental visits  Injury prevention: discussed safety/seat belts, safety helmets, smoke detectors, carbon dioxide detectors, and smoking near bedding or upholstery  Sexual health: discussed sexually transmitted diseases, partner selection, use of condoms, avoidance of unintended pregnancy, and contraceptive alternatives  · Exercise: the importance of regular exercise/physical activity was discussed  Recommend exercise 3-5 times per week for at least 30 minutes  BMI Counseling: Body mass index is 29 81 kg/m²   The BMI is above normal  Nutrition recommendations include encouraging healthy choices of fruits and vegetables, consuming healthier snacks and moderation in carbohydrate intake  Exercise recommendations include moderate physical activity 150 minutes/week and exercising 3-5 times per week  Depression Screening and Follow-up Plan: Patient's depression screening was positive with a PHQ-2 score of 0  Clincally patient does not have depression  No treatment is required  Return in 6 weeks (on 5/14/2021) for Recheck GERD  Chief Complaint:     Chief Complaint   Patient presents with    Physical Exam    Heartburn      History of Present Illness:     Adult Annual Physical   Patient here for a comprehensive physical exam  The patient reports problems - worsening reflux x 6 months  Please refer to separate encounter below       Diet and Physical Activity  · Diet/Nutrition: well balanced diet, heart healthy (low sodium) diet and limited junk food  · Exercise: 3-4 times a week on average and 30-60 minutes on average  Depression Screening  PHQ-9 Depression Screening    PHQ-9:   Frequency of the following problems over the past two weeks:      Little interest or pleasure in doing things: 0 - not at all  Feeling down, depressed, or hopeless: 0 - not at all  PHQ-2 Score: 0       General Health  · Sleep: sleeps well and gets 4-6 hours of sleep on average  · Hearing: normal - bilateral   · Vision: no vision problems  · Dental: brushes teeth twice daily   Health  · Symptoms include: none     Review of Systems:     Review of Systems   Constitutional: Negative for appetite change, fatigue and unexpected weight change  HENT: Negative for hearing loss and trouble swallowing  Eyes: Negative for visual disturbance  Respiratory: Negative for chest tightness and shortness of breath  Cardiovascular: Negative for chest pain and palpitations  Gastrointestinal: Negative for blood in stool, nausea and vomiting  Endocrine: Negative for polyuria  Genitourinary: Negative for decreased urine volume and difficulty urinating     Skin: Negative for rash  Neurological: Negative for dizziness and headaches        Past Medical History:     Past Medical History:   Diagnosis Date    Kidney stones     Tinnitus       Past Surgical History:     Past Surgical History:   Procedure Laterality Date    NO PAST SURGERIES        Family History:     Family History   Problem Relation Age of Onset    Colon cancer Mother     Stomach cancer Mother     No Known Problems Father     Diabetes Maternal Grandmother     Heart attack Maternal Grandfather     Diabetes Other     Cancer Other       Social History:        Social History     Socioeconomic History    Marital status: /Civil Union     Spouse name: Not on file    Number of children: Not on file    Years of education: Not on file    Highest education level: Not on file   Occupational History    Occupation: Works in a [employer]   Social Needs    Financial resource strain: Not on file    Food insecurity     Worry: Not on file     Inability: Not on file   Tellpe needs     Medical: Not on file     Non-medical: Not on file   Tobacco Use    Smoking status: Never Smoker    Smokeless tobacco: Never Used   Substance and Sexual Activity    Alcohol use: No    Drug use: No    Sexual activity: Not on file   Lifestyle    Physical activity     Days per week: Not on file     Minutes per session: Not on file    Stress: Not on file   Relationships    Social connections     Talks on phone: Not on file     Gets together: Not on file     Attends Temple service: Not on file     Active member of club or organization: Not on file     Attends meetings of clubs or organizations: Not on file     Relationship status: Not on file    Intimate partner violence     Fear of current or ex partner: Not on file     Emotionally abused: Not on file     Physically abused: Not on file     Forced sexual activity: Not on file   Other Topics Concern    Not on file   Social History Narrative    Not on file      Current Medications:     Current Outpatient Medications   Medication Sig Dispense Refill    famotidine (PEPCID) 40 MG tablet Take 1 tablet (40 mg total) by mouth daily at bedtime 90 tablet 0    pantoprazole (PROTONIX) 40 mg tablet Take 1 tablet (40 mg total) by mouth daily 90 tablet 0    albuterol (PROVENTIL HFA,VENTOLIN HFA) 90 mcg/act inhaler Inhale 2 puffs every 6 (six) hours as needed for wheezing or shortness of breath (Patient not taking: Reported on 4/2/2021) 1 Inhaler 0    aluminum hydroxide-magnesium carbonate (Gaviscon)  mg/15 mL oral suspension Take 15 mL by mouth 3 (three) times daily after meals 1 Bottle 0    carbamide peroxide (DEBROX) 6 5 % otic solution Administer 5 drops into both ears 2 (two) times a day 15 mL 0    meloxicam (MOBIC) 15 mg tablet Take 1 tablet (15 mg total) by mouth daily (Patient not taking: Reported on 4/2/2021) 15 tablet 0    predniSONE 10 mg tablet TAKE 4 TAB PO DAILY X 3 DAYS, WITH FOOD (Patient not taking: Reported on 4/2/2021) 12 tablet 0     No current facility-administered medications for this visit  Allergies:     No Known Allergies   Physical Exam:     /70 (BP Location: Left arm, Patient Position: Sitting, Cuff Size: Adult)   Pulse 74   Temp 97 8 °F (36 6 °C) (Temporal)   Ht 5' 1 5" (1 562 m)   Wt 72 7 kg (160 lb 6 oz)   SpO2 97%   BMI 29 81 kg/m²     Please note physical exam was performed under separate visit for chronic conditions  The patient indicates understanding of these issues and agrees with the plan      Charles Dunn MD  [facility]

## 2021-04-02 NOTE — PATIENT INSTRUCTIONS

## 2021-04-05 ENCOUNTER — APPOINTMENT (OUTPATIENT)
Dept: LAB | Facility: CLINIC | Age: 46
End: 2021-04-05
Payer: COMMERCIAL

## 2021-04-05 ENCOUNTER — TRANSCRIBE ORDERS (OUTPATIENT)
Dept: FAMILY MEDICINE CLINIC | Facility: HOSPITAL | Age: 46
End: 2021-04-05

## 2021-04-05 DIAGNOSIS — Z11.4 SCREENING FOR HIV (HUMAN IMMUNODEFICIENCY VIRUS): ICD-10-CM

## 2021-04-05 DIAGNOSIS — R53.83 FATIGUE, UNSPECIFIED TYPE: ICD-10-CM

## 2021-04-05 DIAGNOSIS — Z00.00 ANNUAL PHYSICAL EXAM: ICD-10-CM

## 2021-04-05 DIAGNOSIS — Z13.220 SCREENING FOR LIPID DISORDERS: ICD-10-CM

## 2021-04-05 DIAGNOSIS — Z13.1 SCREENING FOR DIABETES MELLITUS: ICD-10-CM

## 2021-04-05 DIAGNOSIS — Z13.29 SCREENING FOR THYROID DISORDER: ICD-10-CM

## 2021-04-05 LAB
ALBUMIN SERPL BCP-MCNC: 3.9 G/DL (ref 3.5–5)
ALP SERPL-CCNC: 72 U/L (ref 46–116)
ALT SERPL W P-5'-P-CCNC: 27 U/L (ref 12–78)
ANION GAP SERPL CALCULATED.3IONS-SCNC: 8 MMOL/L (ref 4–13)
AST SERPL W P-5'-P-CCNC: 15 U/L (ref 5–45)
BILIRUB SERPL-MCNC: 0.39 MG/DL (ref 0.2–1)
BUN SERPL-MCNC: 14 MG/DL (ref 5–25)
CALCIUM SERPL-MCNC: 8.8 MG/DL (ref 8.3–10.1)
CHLORIDE SERPL-SCNC: 106 MMOL/L (ref 100–108)
CHOLEST SERPL-MCNC: 158 MG/DL (ref 50–200)
CO2 SERPL-SCNC: 28 MMOL/L (ref 21–32)
CREAT SERPL-MCNC: 1.09 MG/DL (ref 0.6–1.3)
ERYTHROCYTE [DISTWIDTH] IN BLOOD BY AUTOMATED COUNT: 11.3 % (ref 11.6–15.1)
EST. AVERAGE GLUCOSE BLD GHB EST-MCNC: 108 MG/DL
FERRITIN SERPL-MCNC: 56 NG/ML (ref 8–388)
GFR SERPL CREATININE-BSD FRML MDRD: 82 ML/MIN/1.73SQ M
GLUCOSE P FAST SERPL-MCNC: 101 MG/DL (ref 65–99)
HBA1C MFR BLD: 5.4 %
HCT VFR BLD AUTO: 44.2 % (ref 36.5–49.3)
HDLC SERPL-MCNC: 45 MG/DL
HGB BLD-MCNC: 15.1 G/DL (ref 12–17)
IRON SATN MFR SERPL: 18 %
IRON SERPL-MCNC: 57 UG/DL (ref 65–175)
LDLC SERPL CALC-MCNC: 92 MG/DL (ref 0–100)
MCH RBC QN AUTO: 30.3 PG (ref 26.8–34.3)
MCHC RBC AUTO-ENTMCNC: 34.2 G/DL (ref 31.4–37.4)
MCV RBC AUTO: 89 FL (ref 82–98)
NONHDLC SERPL-MCNC: 113 MG/DL
PLATELET # BLD AUTO: 216 THOUSANDS/UL (ref 149–390)
PMV BLD AUTO: 9.7 FL (ref 8.9–12.7)
POTASSIUM SERPL-SCNC: 4 MMOL/L (ref 3.5–5.3)
PROT SERPL-MCNC: 7.4 G/DL (ref 6.4–8.2)
RBC # BLD AUTO: 4.98 MILLION/UL (ref 3.88–5.62)
SODIUM SERPL-SCNC: 142 MMOL/L (ref 136–145)
TIBC SERPL-MCNC: 318 UG/DL (ref 250–450)
TRIGL SERPL-MCNC: 104 MG/DL
TSH SERPL DL<=0.05 MIU/L-ACNC: 1.81 UIU/ML (ref 0.36–3.74)
VIT B12 SERPL-MCNC: 355 PG/ML (ref 100–900)
WBC # BLD AUTO: 5.33 THOUSAND/UL (ref 4.31–10.16)

## 2021-04-05 PROCEDURE — 87389 HIV-1 AG W/HIV-1&-2 AB AG IA: CPT

## 2021-04-05 PROCEDURE — 83550 IRON BINDING TEST: CPT

## 2021-04-05 PROCEDURE — 83036 HEMOGLOBIN GLYCOSYLATED A1C: CPT

## 2021-04-05 PROCEDURE — 85027 COMPLETE CBC AUTOMATED: CPT

## 2021-04-05 PROCEDURE — 80053 COMPREHEN METABOLIC PANEL: CPT

## 2021-04-05 PROCEDURE — 84443 ASSAY THYROID STIM HORMONE: CPT

## 2021-04-05 PROCEDURE — 80061 LIPID PANEL: CPT

## 2021-04-05 PROCEDURE — 82728 ASSAY OF FERRITIN: CPT

## 2021-04-05 PROCEDURE — 82607 VITAMIN B-12: CPT

## 2021-04-05 PROCEDURE — 83540 ASSAY OF IRON: CPT

## 2021-04-05 PROCEDURE — 36415 COLL VENOUS BLD VENIPUNCTURE: CPT

## 2021-04-06 LAB — HIV 1+2 AB+HIV1 P24 AG SERPL QL IA: NORMAL

## 2021-04-13 DIAGNOSIS — Z23 ENCOUNTER FOR IMMUNIZATION: ICD-10-CM

## 2021-04-14 ENCOUNTER — TELEPHONE (OUTPATIENT)
Dept: FAMILY MEDICINE CLINIC | Facility: OTHER | Age: 46
End: 2021-04-14

## 2021-04-14 NOTE — TELEPHONE ENCOUNTER
Discussed my thoughts (via mychart) with patient already regarding treatment length for prilosec  We have not optimized medical therapy at this time  Would urge him to at least trial increased dose of medication for 2 weeks to see response  If patient does not feel comfortable with above plan, I can refer him to GI for further work-up  Please let me know      Thanks,  Dr KELLY

## 2021-04-14 NOTE — TELEPHONE ENCOUNTER
Pt stating that medication isn't not working  Pt states not feeling well, fatigue  Pt states has lost 2lbs this weeks and  11 pounds in 1 month  158lb  today  Pt would like to discuss labs and issues about not feeling well       Please advise     Maren Rocha MA

## 2021-04-15 ENCOUNTER — TELEPHONE (OUTPATIENT)
Dept: FAMILY MEDICINE CLINIC | Facility: OTHER | Age: 46
End: 2021-04-15

## 2021-04-15 NOTE — TELEPHONE ENCOUNTER
Regarding: RE: Test Results Question  Contact: [phone]   [address] por stevens respuesta  y le pregunto entonces si  lizbeth shayla algún suplemento de abisai o cual es la recomendación? Lacey Landau y el gaviscon me hace más reflujo lo suspendo ? Marylee Brandon   ----- Message -----  From: Lorena Joy MD  Sent: 4/14/21 12:33 PM  To: Amando Flores  Subject: RE: Test Results Question    Paul  Toya Nova,    Despues de revisar boaz [place], stevens function hepatica, renal y tiroidea estan normales  Tambien boaz recuentos sanguineos  Lo unico que si estaba un poco pajo es stevens nivel de abisai  Podemos monitorearlo por ahora ya que puede ser stevens base normal  Con respecto a stevens otra pregunta, si necesitamos monitorearlo con la terapia optima  Si desea puede tomarse deborah segunda pastilla de protonix para un total de 80mg para indio si ayuda con los sintomas  Con respeto,  Dra KELLY    ------------------------------------------------------------------------------------------  (For office staff )  Patients labs are stable, he may increase protonix dose to 80mg (2 tablets daily) to see if this helps with symptoms  Dr ROBIN Ortega      ----- Message -----       From:Erich Reynolds       Sent:4/10/2021  9:43 AM EDT         To:Oneyda Charles MD    Subject:Test Results Question    doctora buenos mcginnis steve esta deseo que joy   doctora  queria saber cual fue el resultado de mis examenes de laboratorio  y otra pregunta que deseo hacerle es  yo estoy diciplinado con mis pastillas que me envio amanda no veo mejoria es necesario que el tiempo de espera sea se 6 semanas para verle de nuevo  hoy es sabado 10 de lin y llevo ya un ugo con diarrea     voy a hacerle seguimiemnto  a esto amanda creeame que es preciso un examen mas exhaustivo para mi mauri de ante mano muchas isabell quedo atento a respuesta guerrero ugo

## 2021-04-15 NOTE — TELEPHONE ENCOUNTER
Pt aware of PCP's note and agreeable  Patient Will call back in 2 weeks if he feels that gastroenterology is needed       Amy Quiles MA

## 2021-04-30 ENCOUNTER — IMMUNIZATIONS (OUTPATIENT)
Dept: FAMILY MEDICINE CLINIC | Facility: HOSPITAL | Age: 46
End: 2021-04-30

## 2021-04-30 DIAGNOSIS — Z23 ENCOUNTER FOR IMMUNIZATION: Primary | ICD-10-CM

## 2021-04-30 PROCEDURE — 0001A SARS-COV-2 / COVID-19 MRNA VACCINE (PFIZER-BIONTECH) 30 MCG: CPT | Performed by: PHYSICIAN ASSISTANT

## 2021-04-30 PROCEDURE — 91300 SARS-COV-2 / COVID-19 MRNA VACCINE (PFIZER-BIONTECH) 30 MCG: CPT | Performed by: PHYSICIAN ASSISTANT

## 2021-05-17 ENCOUNTER — RA CDI HCC (OUTPATIENT)
Dept: OTHER | Facility: HOSPITAL | Age: 46
End: 2021-05-17

## 2021-05-17 NOTE — PROGRESS NOTES
[facility] 75  coding opportunities          Chart reviewed, no opportunity found: CHART REVIEWED, NO OPPORTUNITY FOUND              Patients insurance company: [employer] (Medicare Advantage and Commercial)

## 2021-05-21 ENCOUNTER — OFFICE VISIT (OUTPATIENT)
Dept: FAMILY MEDICINE CLINIC | Facility: OTHER | Age: 46
End: 2021-05-21
Payer: COMMERCIAL

## 2021-05-21 ENCOUNTER — IMMUNIZATIONS (OUTPATIENT)
Dept: FAMILY MEDICINE CLINIC | Facility: HOSPITAL | Age: 46
End: 2021-05-21

## 2021-05-21 VITALS
SYSTOLIC BLOOD PRESSURE: 110 MMHG | WEIGHT: 159 LBS | OXYGEN SATURATION: 98 % | HEART RATE: 72 BPM | BODY MASS INDEX: 29.26 KG/M2 | DIASTOLIC BLOOD PRESSURE: 70 MMHG | HEIGHT: 62 IN | RESPIRATION RATE: 16 BRPM | TEMPERATURE: 97.8 F

## 2021-05-21 DIAGNOSIS — R63.4 UNEXPLAINED WEIGHT LOSS: ICD-10-CM

## 2021-05-21 DIAGNOSIS — Z23 ENCOUNTER FOR IMMUNIZATION: Primary | ICD-10-CM

## 2021-05-21 DIAGNOSIS — K21.00 GASTROESOPHAGEAL REFLUX DISEASE WITH ESOPHAGITIS WITHOUT HEMORRHAGE: Primary | ICD-10-CM

## 2021-05-21 PROBLEM — E61.1 IRON DEFICIENCY: Status: ACTIVE | Noted: 2021-05-21

## 2021-05-21 PROCEDURE — 1036F TOBACCO NON-USER: CPT | Performed by: FAMILY MEDICINE

## 2021-05-21 PROCEDURE — 99213 OFFICE O/P EST LOW 20 MIN: CPT | Performed by: FAMILY MEDICINE

## 2021-05-21 PROCEDURE — 91300 SARS-COV-2 / COVID-19 MRNA VACCINE (PFIZER-BIONTECH) 30 MCG: CPT

## 2021-05-21 PROCEDURE — 3008F BODY MASS INDEX DOCD: CPT | Performed by: FAMILY MEDICINE

## 2021-05-21 PROCEDURE — 0002A SARS-COV-2 / COVID-19 MRNA VACCINE (PFIZER-BIONTECH) 30 MCG: CPT

## 2021-05-21 NOTE — PROGRESS NOTES
Assessment/Plan:       Diagnoses and all orders for this visit:    Gastroesophageal reflux disease with esophagitis without hemorrhage  -     Patient has failed optimized treatment of PPI+ H2 blocker x 4-6 weeks  -     Discussed with patient that may have underlying H pylori infection and could       trial and treat  However, given concern for unintentional weight loss will refer to GI for endoscopy at this time  -     Ambulatory Referral to GI Endoscopy; Future    Unexplained weight loss  -    Patient reports he has noticed unintentional weight loss for the last 6 months  -    Upon review of record patient has lost about 7lbs in the last 6 months  -     Ambulatory Referral to GI Endoscopy; Future        Subjective:      Patient ID: Josesito Valdes is a 40 y o  male  Heartburn  He complains of abdominal pain, belching and heartburn  He reports no chest pain, no coughing, no dysphagia, no early satiety or no nausea  Intermittent abdominal pain  This is a chronic problem  Episode onset: Has been present for several years but worsening over the last 7 months  The problem occurs constantly  The problem has been gradually worsening  The heartburn duration is more than one hour  The heartburn is located in the substernum  Heartburn pain intensity: Moderate to severe intensity  The heartburn does not wake him from sleep  The heartburn does not limit his activity  Exacerbated by: Patient states he continues to avoid aggravting factors such as coffee, spicy food etc  Associated symptoms include anemia and weight loss  Pertinent negatives include no fatigue or melena  There are no known risk factors  He has tried an antacid, a histamine-2 antagonist, a PPI and a diet change for the symptoms  The treatment provided no relief         The following portions of the patient's history were reviewed and updated as appropriate: allergies, current medications, past family history, past medical history, past social history, past surgical history and problem list     Review of Systems   Constitutional: Positive for unexpected weight change and weight loss  Negative for fatigue and fever  HENT: Negative for postnasal drip and rhinorrhea  Eyes: Negative for visual disturbance  Respiratory: Negative for cough and shortness of breath  Cardiovascular: Negative for chest pain and palpitations  Gastrointestinal: Positive for abdominal distention, abdominal pain and heartburn  Negative for diarrhea, dysphagia, melena, nausea and vomiting  Neurological: Negative for dizziness and light-headedness  Objective:      /70   Pulse 72   Temp 97 8 °F (36 6 °C)   Resp 16   Ht 5' 1 5" (1 562 m)   Wt 72 1 kg (159 lb)   SpO2 98%   BMI 29 56 kg/m²          Physical Exam  Vitals signs reviewed  Constitutional:       Appearance: He is well-developed  HENT:      Head: Normocephalic and atraumatic  Nose: Nose normal    Eyes:      Conjunctiva/sclera: Conjunctivae normal    Neck:      Musculoskeletal: Normal range of motion and neck supple  Cardiovascular:      Rate and Rhythm: Normal rate and regular rhythm  Heart sounds: Normal heart sounds  Pulmonary:      Effort: Pulmonary effort is normal       Breath sounds: Normal breath sounds  Abdominal:      General: Bowel sounds are normal       Palpations: Abdomen is soft  Musculoskeletal: Normal range of motion  Skin:     General: Skin is warm and dry  Neurological:      Mental Status: He is alert and oriented to person, place, and time  Psychiatric:         Mood and Affect: Mood normal          Thought Content:  Thought content normal

## 2021-06-01 ENCOUNTER — TELEPHONE (OUTPATIENT)
Dept: FAMILY MEDICINE CLINIC | Facility: OTHER | Age: 46
End: 2021-06-01

## 2021-06-01 NOTE — TELEPHONE ENCOUNTER
Patient states he tried to schedule appt with GI for Endoscopy but next available is not until November  Is it okay to wait this long? If not, patient would like to know if PCP can refer another GI doctor

## 2021-06-02 ENCOUNTER — TELEPHONE (OUTPATIENT)
Dept: FAMILY MEDICINE CLINIC | Facility: OTHER | Age: 46
End: 2021-06-02

## 2021-06-02 DIAGNOSIS — K21.9 GASTROESOPHAGEAL REFLUX DISEASE WITHOUT ESOPHAGITIS: Primary | ICD-10-CM

## 2021-06-02 RX ORDER — CLARITHROMYCIN 500 MG/1
500 TABLET, COATED ORAL 2 TIMES DAILY
Qty: 28 TABLET | Refills: 0 | Status: SHIPPED | OUTPATIENT
Start: 2021-06-02 | End: 2021-06-16

## 2021-06-02 RX ORDER — AMOXICILLIN 500 MG/1
1000 CAPSULE ORAL 2 TIMES DAILY
Qty: 56 CAPSULE | Refills: 0 | Status: SHIPPED | OUTPATIENT
Start: 2021-06-02 | End: 2021-06-16

## 2021-06-02 NOTE — TELEPHONE ENCOUNTER
----- Message from [place], [place] sent at 6/1/2021  2:30 PM EDT -----  Regarding: FW: Non-Urgent Medical Question  Contact: [phone]    ----- Message -----  From: Gabrielle Kerr  Sent: 6/1/2021   2:25 PM EDT  To: [facility]  Subject: Non-Urgent Medical Question                      Bridgett perez [facility] deseó que Siddharth Baldwin yo no mejoro de gustavo reflujo y llame a pedir la agnes con el gastro que usted me refirió y la única agnes cercana es para noviembre esta muy lejos usted me puede decir que puedo hacer o si puede referirme a otro gastro se que el es muy garrett amanda es importante para mi esto isabell guerrero tarde

## 2021-06-02 NOTE — TELEPHONE ENCOUNTER
Dottie Chin,    Thanks for sending this over  Waiting until Nov  is too much of a wait  Now that the patient has the referral he can honestly go to any GI specialist whether in or out of the network  Unfortunately, I do not have a way to know wait times for the different specialists  My suggestion is that the patient [facility] to [facility] other local healthcare providers and directly ask when the soonest appointment would be  I will also call the patient and discuss this plan with him      Thanks,  Dr KELLY

## 2021-06-07 ENCOUNTER — TELEPHONE (OUTPATIENT)
Dept: FAMILY MEDICINE CLINIC | Facility: OTHER | Age: 46
End: 2021-06-07

## 2021-06-07 ENCOUNTER — PATIENT MESSAGE (OUTPATIENT)
Dept: FAMILY MEDICINE CLINIC | Facility: OTHER | Age: 46
End: 2021-06-07

## 2021-06-07 NOTE — TELEPHONE ENCOUNTER
From: Tosha Sotomayor  To: Brien Schumacher MD  Sent: 6/7/2021 9:16 AM EDT  Subject: Prescription Question    Doctora buenos días steve esta deseando que joy doctora usted me dijo que lizbeth tomarme los 2 antibióticos amanda yo me los estoy tomando steve dice 3 en la noche y 3 en el día esta correcto? Y usted me los recetó por el elicobacter amanda tengo esta bacteria?  Mitchael Dancer yo me los estoy tomando y ya tengo agnes con gastro en [place] isabell

## 2021-06-07 NOTE — TELEPHONE ENCOUNTER
Ladies please see the full message, below the bold line there is an abbreviated english translation of the response to the patient!     Thanks,  Dr KELLY

## 2021-06-07 NOTE — TELEPHONE ENCOUNTER
Patient called for the message you left but we are unable to read it do to being in Indonesian to assist the patient   Please advise   Thank you

## 2021-08-12 ENCOUNTER — OFFICE VISIT (OUTPATIENT)
Dept: GASTROENTEROLOGY | Facility: AMBULARY SURGERY CENTER | Age: 46
End: 2021-08-12
Payer: COMMERCIAL

## 2021-08-12 VITALS
SYSTOLIC BLOOD PRESSURE: 114 MMHG | HEIGHT: 62 IN | WEIGHT: 160 LBS | DIASTOLIC BLOOD PRESSURE: 70 MMHG | BODY MASS INDEX: 29.44 KG/M2

## 2021-08-12 DIAGNOSIS — K21.9 GASTROESOPHAGEAL REFLUX DISEASE: ICD-10-CM

## 2021-08-12 DIAGNOSIS — K21.9 GASTROESOPHAGEAL REFLUX DISEASE, UNSPECIFIED WHETHER ESOPHAGITIS PRESENT: Primary | ICD-10-CM

## 2021-08-12 DIAGNOSIS — R19.8 CHANGE IN BOWEL MOVEMENT: ICD-10-CM

## 2021-08-12 DIAGNOSIS — K21.9 LARYNGOPHARYNGEAL REFLUX (LPR): ICD-10-CM

## 2021-08-12 DIAGNOSIS — Z80.0 FAMILY HISTORY OF COLON CANCER IN MOTHER: ICD-10-CM

## 2021-08-12 PROCEDURE — 1036F TOBACCO NON-USER: CPT | Performed by: INTERNAL MEDICINE

## 2021-08-12 PROCEDURE — 99244 OFF/OP CNSLTJ NEW/EST MOD 40: CPT | Performed by: INTERNAL MEDICINE

## 2021-08-12 PROCEDURE — 3008F BODY MASS INDEX DOCD: CPT | Performed by: INTERNAL MEDICINE

## 2021-08-12 RX ORDER — SODIUM, POTASSIUM,MAG SULFATES 17.5-3.13G
1 SOLUTION, RECONSTITUTED, ORAL ORAL ONCE
Qty: 177 ML | Refills: 0 | Status: SHIPPED | OUTPATIENT
Start: 2021-08-12 | End: 2022-03-28 | Stop reason: ALTCHOICE

## 2021-08-12 RX ORDER — FAMOTIDINE 40 MG/1
40 TABLET, FILM COATED ORAL
Qty: 90 TABLET | Refills: 2 | Status: SHIPPED | OUTPATIENT
Start: 2021-08-12 | End: 2022-03-28 | Stop reason: ALTCHOICE

## 2021-08-12 RX ORDER — PANTOPRAZOLE SODIUM 40 MG/1
40 TABLET, DELAYED RELEASE ORAL DAILY
Qty: 90 TABLET | Refills: 2 | Status: SHIPPED | OUTPATIENT
Start: 2021-08-12 | End: 2022-03-28 | Stop reason: ALTCHOICE

## 2021-08-12 NOTE — PROGRESS NOTES
[facility]ultation -  [facility]  Ana Tobin 40 y o  male MRN: [id number]          Assessment & Plan:     pleasant 44-year-old gentleman, reflux symptoms for the last 1 and half years, occasional unformed stools as well  1  GERD:  Stable symptoms are typical of reflux, he was treated empirically for H pylori, unclear if he had any antibody testing   - we will schedule patient for an EGD to evaluate for reflux esophagitis and H pylori  - we will start the patient on PPI therapy in the morning, H2 blocker in the evening and  - discussed with him lifestyle modifications  - patient peers to have some LPR symptoms      2  Mild change in bowel movements: Some irregular stools at times   -given his age will proceed with colonoscopy to exclude significant luminal disease processes   - discussed with him risks of procedures including bleeding, surgery, perforation, missed polyp detection rate        Erich was seen today for abdominal pain  Diagnoses and all orders for this visit:    Gastroesophageal reflux disease, unspecified whether esophagitis present  -     EGD; Future    Family history of colon cancer in mother  -     Na Sulfate-K Sulfate-Mg Sulf (Suprep Bowel Prep Kit) 17 5-3 13-1 6 GM/177ML SOLN; Take 1 Bottle by mouth once for 1 dose  -     Colonoscopy; Future    Change in bowel movement  -     Na Sulfate-K Sulfate-Mg Sulf (Suprep Bowel Prep Kit) 17 5-3 13-1 6 GM/177ML SOLN; Take 1 Bottle by mouth once for 1 dose  -     Colonoscopy; Future    Laryngopharyngeal reflux (LPR)  -     pantoprazole (PROTONIX) 40 mg tablet; Take 1 tablet (40 mg total) by mouth daily    Gastroesophageal reflux disease  -     famotidine (PEPCID) 40 MG tablet; Take 1 tablet (40 mg total) by mouth daily at bedtime            _____________________________________________________________        CC:  GERD and reflux    HPI:  Ana Tobin is a 40 y o male who was referred for evaluation of  GERD and reflux    This is a pleasant 52-year-old gentleman otherwise in good health, he reports that he has had longstanding episodes over the last year to 1 and half years with retrosternal discomfort, which he describes as regurgitation burning  Frequently has the sensation of acid in his mouth, 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[employer] and has noticed hoarseness in his voice  Patient reports that he had been on PPI therapy and H2 blockers as well as Gaviscon with some mild improvement in symptoms  Recently after traveling to [facility] with the above symptoms he was treated for H pylori empirically with some improvement in symptoms  Additionally he reports having somewhat poorly formed stools, occasional episodes of rectal spasm  He does sometimes drinks some coffee have a cracker about 1 and half to 2 hours before going to bed  He takes NSAIDs infrequently  Had no significant other medical or surgical history  Denies any alcohol or tobacco   He works in a [employer]  Family history is negative for GI or associated malignancies  ROS:  The remainder of the ROS was negative except for the pertinent positives mentioned in HPI  Allergies: Patient has no known allergies      Medications:   Current Outpatient Medications:     carbamide peroxide (DEBROX) 6 5 % otic solution, Administer 5 drops into both ears 2 (two) times a day, Disp: 15 mL, Rfl: 0    famotidine (PEPCID) 40 MG tablet, Take 1 tablet (40 mg total) by mouth daily at bedtime, Disp: 90 tablet, Rfl: 2    pantoprazole (PROTONIX) 40 mg tablet, Take 1 tablet (40 mg total) by mouth daily, Disp: 90 tablet, Rfl: 2    albuterol (PROVENTIL HFA,VENTOLIN HFA) 90 mcg/act inhaler, Inhale 2 puffs every 6 (six) hours as needed for wheezing or shortness of breath (Patient not taking: Reported on 4/2/2021), Disp: 1 Inhaler, Rfl: 0    aluminum hydroxide-magnesium carbonate (Gaviscon)  mg/15 mL oral suspension, Take 15 mL by mouth 3 (three) times daily after meals (Patient not taking: Reported on 5/21/2021), Disp: 1 Bottle, Rfl: 0    meloxicam (MOBIC) 15 mg tablet, Take 1 tablet (15 mg total) by mouth daily (Patient not taking: Reported on 4/2/2021), Disp: 15 tablet, Rfl: 0    Na Sulfate-K Sulfate-Mg Sulf (Suprep Bowel Prep Kit) 17 5-3 13-1 6 GM/177ML SOLN, Take 1 Bottle by mouth once for 1 dose, Disp: 177 mL, Rfl: 0    predniSONE 10 mg tablet, TAKE 4 TAB PO DAILY X 3 DAYS, WITH FOOD (Patient not taking: Reported on 4/2/2021), Disp: 12 tablet, Rfl: 0'    Past Medical History:   Diagnosis Date    Kidney stones     Tinnitus        Past Surgical History:   Procedure Laterality Date    NO PAST SURGERIES         Family History   Problem Relation Age of Onset    Colon cancer Mother     Stomach cancer Mother     No Known Problems Father     Diabetes Maternal Grandmother     Heart attack Maternal Grandfather     Diabetes Other     Cancer Other         reports that he has never smoked  He has never used smokeless tobacco  He reports that he does not drink alcohol and does not use drugs            Physical Exam:     /70   Ht 5' 1 5" (1 562 m)   Wt 72 6 kg (160 lb)   BMI 29 74 kg/m²     Gen: wn/wd, NAD, healthy-appearing gentleman  HEENT: anicteric, MMM ,, no cervical LAD  CVS: RRR, no m/r/g  CHEST: CTA b/l  ABD: +BS, soft, NT,ND, no hepatosplenomegaly  EXT: no c/c/e  NEURO: aaox3  SKIN: NO rashes

## 2021-08-12 NOTE — LETTER
August 12, 2021     Jose Noland MD  [address]  [facility] [address]    Patient: Vipul Murray   YOB: 1975   Date of Visit: 8/12/2021       Dear Dr Arellano Son: Thank you for referring Vipul Murray to me for evaluation  Below are my notes for this consultation  If you have questions, please do not hesitate to call me  I look forward to following your patient along with you  Sincerely,        Belinda Bean MD        CC: No Recipients  Belinda Bean MD  8/12/2021  1:04 PM  Sign when Signing Visit  Consultation - [facility]  Vipul Murray 40 y o  male MRN: [id number]          Assessment & Plan:     pleasant 28-year-old gentleman, reflux symptoms for the last 1 and half years, occasional unformed stools as well  1  GERD:  Stable symptoms are typical of reflux, he was treated empirically for H pylori, unclear if he had any antibody testing   - we will schedule patient for an EGD to evaluate for reflux esophagitis and H pylori  - we will start the patient on PPI therapy in the morning, H2 blocker in the evening and  - discussed with him lifestyle modifications  - patient peers to have some LPR symptoms      2  Mild change in bowel movements: Some irregular stools at times   -given his age will proceed with colonoscopy to exclude significant luminal disease processes   - discussed with him risks of procedures including bleeding, surgery, perforation, missed polyp detection rate        Erich was seen today for abdominal pain  Diagnoses and all orders for this visit:    Gastroesophageal reflux disease, unspecified whether esophagitis present  -     EGD; Future    Family history of colon cancer in mother  -     Na Sulfate-K Sulfate-Mg Sulf (Suprep Bowel Prep Kit) 17 5-3 13-1 6 GM/177ML SOLN; Take 1 Bottle by mouth once for 1 dose  -     Colonoscopy;  Future    Change in bowel movement  -     Na Sulfate-K Sulfate-Mg Sulf (Suprep Bowel Prep Kit) 17 5-3 13-1 6 GM/177ML SOLN; Take 1 Bottle by mouth once for 1 dose  -     Colonoscopy; Future    Laryngopharyngeal reflux (LPR)  -     pantoprazole (PROTONIX) 40 mg tablet; Take 1 tablet (40 mg total) by mouth daily    Gastroesophageal reflux disease  -     famotidine (PEPCID) 40 MG tablet; Take 1 tablet (40 mg total) by mouth daily at bedtime            _____________________________________________________________        CC:  GERD and reflux    HPI:  Devaughn Rojas is a 40 y o male who was referred for evaluation of  GERD and reflux  This is a pleasant 52-year-old gentleman otherwise in good health, he reports that he has had longstanding episodes over the last year to 1 and half years with retrosternal discomfort, which he describes as regurgitation burning  Frequently has the sensation of acid in his mouth, 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[employer] and has noticed hoarseness in his voice  Patient reports that he had been on PPI therapy and H2 blockers as well as Gaviscon with some mild improvement in symptoms  Recently after traveling to [facility] with the above symptoms he was treated for H pylori empirically with some improvement in symptoms  Additionally he reports having somewhat poorly formed stools, occasional episodes of rectal spasm  He does sometimes drinks some coffee have a cracker about 1 and half to 2 hours before going to bed  He takes NSAIDs infrequently  Had no significant other medical or surgical history  Denies any alcohol or tobacco   He works in a warehouse  Family history is negative for GI or associated malignancies  ROS:  The remainder of the ROS was negative except for the pertinent positives mentioned in HPI  Allergies: Patient has no known allergies      Medications:   Current Outpatient Medications:     carbamide peroxide (DEBROX) 6 5 % otic solution, Administer 5 drops into both ears 2 (two) times a day, Disp: 15 mL, Rfl: 0    famotidine (PEPCID) 40 MG tablet, Take 1 tablet (40 mg total) by mouth daily at bedtime, Disp: 90 tablet, Rfl: 2    pantoprazole (PROTONIX) 40 mg tablet, Take 1 tablet (40 mg total) by mouth daily, Disp: 90 tablet, Rfl: 2    albuterol (PROVENTIL HFA,VENTOLIN HFA) 90 mcg/act inhaler, Inhale 2 puffs every 6 (six) hours as needed for wheezing or shortness of breath (Patient not taking: Reported on 4/2/2021), Disp: 1 Inhaler, Rfl: 0    aluminum hydroxide-magnesium carbonate (Gaviscon)  mg/15 mL oral suspension, Take 15 mL by mouth 3 (three) times daily after meals (Patient not taking: Reported on 5/21/2021), Disp: 1 Bottle, Rfl: 0    meloxicam (MOBIC) 15 mg tablet, Take 1 tablet (15 mg total) by mouth daily (Patient not taking: Reported on 4/2/2021), Disp: 15 tablet, Rfl: 0    Na Sulfate-K Sulfate-Mg Sulf (Suprep Bowel Prep Kit) 17 5-3 13-1 6 GM/177ML SOLN, Take 1 Bottle by mouth once for 1 dose, Disp: 177 mL, Rfl: 0    predniSONE 10 mg tablet, TAKE 4 TAB PO DAILY X 3 DAYS, WITH FOOD (Patient not taking: Reported on 4/2/2021), Disp: 12 tablet, Rfl: 0'    Past Medical History:   Diagnosis Date    Kidney stones     Tinnitus        Past Surgical History:   Procedure Laterality Date    NO PAST SURGERIES         Family History   Problem Relation Age of Onset    Colon cancer Mother     Stomach cancer Mother     No Known Problems Father     Diabetes Maternal Grandmother     Heart attack Maternal Grandfather     Diabetes Other     Cancer Other         reports that he has never smoked  He has never used smokeless tobacco  He reports that he does not drink alcohol and does not use drugs            Physical Exam:     /70   Ht 5' 1 5" (1 562 m)   Wt 72 6 kg (160 lb)   BMI 29 74 kg/m²     Gen: wn/wd, NAD, healthy-appearing gentleman  HEENT: anicteric, MMM ,, no cervical LAD  CVS: RRR, no m/r/g  CHEST: CTA b/l  ABD: +BS, soft, NT,ND, no hepatosplenomegaly  EXT: no c/c/e  NEURO: aaox3  SKIN: NO rashes

## 2021-09-01 ENCOUNTER — TELEPHONE (OUTPATIENT)
Dept: PREADMISSION TESTING | Facility: HOSPITAL | Age: 46
End: 2021-09-01

## 2021-09-09 ENCOUNTER — ANESTHESIA (OUTPATIENT)
Dept: GASTROENTEROLOGY | Facility: AMBULARY SURGERY CENTER | Age: 46
End: 2021-09-09

## 2021-09-09 ENCOUNTER — HOSPITAL ENCOUNTER (OUTPATIENT)
Dept: GASTROENTEROLOGY | Facility: AMBULARY SURGERY CENTER | Age: 46
Setting detail: OUTPATIENT SURGERY
Discharge: HOME | End: 2021-09-09
Attending: INTERNAL MEDICINE
Payer: COMMERCIAL

## 2021-09-09 ENCOUNTER — ANESTHESIA EVENT (OUTPATIENT)
Dept: GASTROENTEROLOGY | Facility: AMBULARY SURGERY CENTER | Age: 46
End: 2021-09-09

## 2021-09-09 VITALS
HEIGHT: 62 IN | HEART RATE: 82 BPM | BODY MASS INDEX: 29.44 KG/M2 | WEIGHT: 160 LBS | SYSTOLIC BLOOD PRESSURE: 121 MMHG | OXYGEN SATURATION: 100 % | RESPIRATION RATE: 16 BRPM | DIASTOLIC BLOOD PRESSURE: 74 MMHG | TEMPERATURE: 97 F

## 2021-09-09 DIAGNOSIS — Z80.0 FAMILY HISTORY OF COLON CANCER IN MOTHER: ICD-10-CM

## 2021-09-09 DIAGNOSIS — K21.9 GASTROESOPHAGEAL REFLUX DISEASE, UNSPECIFIED WHETHER ESOPHAGITIS PRESENT: ICD-10-CM

## 2021-09-09 DIAGNOSIS — R19.8 CHANGE IN BOWEL MOVEMENT: ICD-10-CM

## 2021-09-09 PROCEDURE — 88305 TISSUE EXAM BY PATHOLOGIST: CPT | Performed by: PATHOLOGY

## 2021-09-09 PROCEDURE — 45380 COLONOSCOPY AND BIOPSY: CPT | Performed by: INTERNAL MEDICINE

## 2021-09-09 PROCEDURE — 43239 EGD BIOPSY SINGLE/MULTIPLE: CPT | Performed by: INTERNAL MEDICINE

## 2021-09-09 RX ORDER — PROPOFOL 10 MG/ML
INJECTION, EMULSION INTRAVENOUS AS NEEDED
Status: DISCONTINUED | OUTPATIENT
Start: 2021-09-09 | End: 2021-09-09

## 2021-09-09 RX ORDER — LIDOCAINE HYDROCHLORIDE 10 MG/ML
0.5 INJECTION, SOLUTION EPIDURAL; INFILTRATION; INTRACAUDAL; PERINEURAL ONCE AS NEEDED
Status: DISCONTINUED | OUTPATIENT
Start: 2021-09-09 | End: 2021-09-13 | Stop reason: HOSPADM

## 2021-09-09 RX ORDER — PROPOFOL 10 MG/ML
INJECTION, EMULSION INTRAVENOUS CONTINUOUS PRN
Status: DISCONTINUED | OUTPATIENT
Start: 2021-09-09 | End: 2021-09-09

## 2021-09-09 RX ORDER — SODIUM CHLORIDE, SODIUM LACTATE, POTASSIUM CHLORIDE, CALCIUM CHLORIDE 600; 310; 30; 20 MG/100ML; MG/100ML; MG/100ML; MG/100ML
INJECTION, SOLUTION INTRAVENOUS CONTINUOUS PRN
Status: DISCONTINUED | OUTPATIENT
Start: 2021-09-09 | End: 2021-09-09

## 2021-09-09 RX ORDER — SODIUM CHLORIDE, SODIUM LACTATE, POTASSIUM CHLORIDE, CALCIUM CHLORIDE 600; 310; 30; 20 MG/100ML; MG/100ML; MG/100ML; MG/100ML
50 INJECTION, SOLUTION INTRAVENOUS CONTINUOUS
Status: DISCONTINUED | OUTPATIENT
Start: 2021-09-09 | End: 2021-09-13 | Stop reason: HOSPADM

## 2021-09-09 RX ADMIN — PROPOFOL 150 MCG/KG/MIN: 10 INJECTION, EMULSION INTRAVENOUS at 10:27

## 2021-09-09 RX ADMIN — SODIUM CHLORIDE, SODIUM LACTATE, POTASSIUM CHLORIDE, AND CALCIUM CHLORIDE 50 ML/HR: .6; .31; .03; .02 INJECTION, SOLUTION INTRAVENOUS at 09:56

## 2021-09-09 RX ADMIN — SODIUM CHLORIDE, SODIUM LACTATE, POTASSIUM CHLORIDE, AND CALCIUM CHLORIDE: .6; .31; .03; .02 INJECTION, SOLUTION INTRAVENOUS at 09:19

## 2021-09-09 RX ADMIN — PROPOFOL 200 MG: 10 INJECTION, EMULSION INTRAVENOUS at 10:27

## 2021-09-09 RX ADMIN — LIDOCAINE HYDROCHLORIDE 40 MG: 20 INJECTION, SOLUTION INTRAVENOUS at 10:27

## 2021-09-09 NOTE — ANESTHESIA POSTPROCEDURE EVALUATION
Post-Op Assessment Note    CV Status:  Stable    Pain management: adequate     Mental Status:  Alert and awake   Hydration Status:  Euvolemic   PONV Controlled:  Controlled   Airway Patency:  Patent      Post Op Vitals Reviewed: Yes      Staff: CRNA         No complications documented      BP  114/71    Temp     Pulse 70   Resp 20   SpO2 99%6lfm

## 2021-09-09 NOTE — ANESTHESIA PREPROCEDURE EVALUATION
Procedure:  COLONOSCOPY  EGD    Relevant Problems   GI/HEPATIC   (+) Gastroesophageal reflux disease      Other   (+) Dysphonia   Unintentional weight loss    Physical Exam    Airway    Mallampati score: I  TM Distance: >3 FB  Neck ROM: full     Dental   No notable dental hx     Cardiovascular      Pulmonary      Other Findings    9/8/16 Exercise Stress Echo:  BASELINE: There were no regional wall motion abnormalities  Left ventricular  size was normal  Overall left ventricular systolic function was normal   Estimated left ventricular ejection fraction was in the range of 55 % to 65 %      PEAK STRESS: There were no regional wall motion abnormalities  There was an  appropriate reduction in left ventricular size  There was an appropriate  augmentation in LV function  Anesthesia Plan  ASA Score- 2     Anesthesia Type- IV sedation with anesthesia with ASA Monitors  Additional Monitors:   Airway Plan:     Comment: I discussed the risks and benefits of IV sedation anesthesia including the possibility of the need to convert to general anesthesia and the potential risk of awareness  Plan Factors-Exercise tolerance (METS): >4 METS  Chart reviewed  EKG reviewed  Existing labs reviewed  Patient summary reviewed  Patient is not a current smoker  Induction- intravenous  Postoperative Plan-     Informed Consent- Anesthetic plan and risks discussed with patient

## 2021-09-09 NOTE — H&P
History and Physical -  [facility]  Denice Myers 40 y o  male MRN: [id number]    HPI: Denice Myers is a 40y o  year old male who presents with GERD and change in bowel movements  Review of Systems    Historical Information   Past Medical History:   Diagnosis Date    GERD (gastroesophageal reflux disease)     Kidney stones     Tinnitus      Past Surgical History:   Procedure Laterality Date    NO PAST SURGERIES       Social History   Social History     Substance and Sexual Activity   Alcohol Use No     Social History     Substance and Sexual Activity   Drug Use No     Social History     Tobacco Use   Smoking Status Never Smoker   Smokeless Tobacco Never Used     Family History   Problem Relation Age of Onset    Colon cancer Mother     Stomach cancer Mother     No Known Problems Father     Diabetes Maternal Grandmother     Heart attack Maternal Grandfather     Diabetes Other     Cancer Other        Meds/Allergies     (Not in a hospital admission)      No Known Allergies    Objective     /88   Pulse 72   Temp (!) 97 °F (36 1 °C) (Tympanic)   Resp 16   Ht 5' 1 5" (1 562 m)   Wt 72 6 kg (160 lb)   SpO2 98%   BMI 29 74 kg/m²       PHYSICAL EXAM    Gen: NAD  CV: RRR  CHEST: Clear  ABD: soft, NT/ND  EXT: no edema  Neuro: AAO      ASSESSMENT/PLAN:  This is a 40y o  year old male here for GERD and change in bowel movements         PLAN:   Procedure: egd/colonsocopy

## 2021-10-04 ENCOUNTER — OFFICE VISIT (OUTPATIENT)
Dept: FAMILY MEDICINE CLINIC | Facility: OTHER | Age: 46
End: 2021-10-04
Payer: COMMERCIAL

## 2021-10-04 VITALS
SYSTOLIC BLOOD PRESSURE: 108 MMHG | OXYGEN SATURATION: 98 % | DIASTOLIC BLOOD PRESSURE: 70 MMHG | HEART RATE: 70 BPM | TEMPERATURE: 98 F | BODY MASS INDEX: 29.96 KG/M2 | WEIGHT: 162.8 LBS | RESPIRATION RATE: 18 BRPM | HEIGHT: 62 IN

## 2021-10-04 DIAGNOSIS — M77.12 LATERAL EPICONDYLITIS, LEFT ELBOW: Primary | ICD-10-CM

## 2021-10-04 PROCEDURE — 3008F BODY MASS INDEX DOCD: CPT | Performed by: FAMILY MEDICINE

## 2021-10-04 PROCEDURE — 1036F TOBACCO NON-USER: CPT | Performed by: FAMILY MEDICINE

## 2021-10-04 PROCEDURE — 99213 OFFICE O/P EST LOW 20 MIN: CPT | Performed by: FAMILY MEDICINE

## 2022-01-23 ENCOUNTER — IMMUNIZATIONS (OUTPATIENT)
Dept: FAMILY MEDICINE CLINIC | Facility: HOSPITAL | Age: 47
End: 2022-01-23

## 2022-01-23 DIAGNOSIS — Z23 ENCOUNTER FOR IMMUNIZATION: Primary | ICD-10-CM

## 2022-01-23 PROCEDURE — 0001A COVID-19 PFIZER VACC 0.3 ML: CPT

## 2022-01-23 PROCEDURE — 91300 COVID-19 PFIZER VACC 0.3 ML: CPT

## 2022-02-15 ENCOUNTER — APPOINTMENT (OUTPATIENT)
Dept: URGENT CARE | Age: 47
End: 2022-02-15

## 2022-02-28 ENCOUNTER — APPOINTMENT (OUTPATIENT)
Dept: URGENT CARE | Age: 47
End: 2022-02-28
Payer: OTHER MISCELLANEOUS

## 2022-02-28 PROCEDURE — 99214 OFFICE O/P EST MOD 30 MIN: CPT | Performed by: PREVENTIVE MEDICINE

## 2022-03-25 PROBLEM — R53.81 MALAISE: Status: RESOLVED | Noted: 2020-04-05 | Resolved: 2022-03-25

## 2022-03-25 PROBLEM — Z20.828 EXPOSURE TO SARS-ASSOCIATED CORONAVIRUS: Status: RESOLVED | Noted: 2020-04-05 | Resolved: 2022-03-25

## 2022-03-25 PROBLEM — N41.0 ACUTE PROSTATITIS: Status: RESOLVED | Noted: 2019-08-05 | Resolved: 2022-03-25

## 2022-03-25 PROBLEM — R50.9 FEVER, LOW GRADE: Status: RESOLVED | Noted: 2020-04-05 | Resolved: 2022-03-25
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
## 2022-03-28 ENCOUNTER — OFFICE VISIT (OUTPATIENT)
Dept: FAMILY MEDICINE CLINIC | Facility: CLINIC | Age: 47
End: 2022-03-28
Payer: COMMERCIAL

## 2022-03-28 VITALS
SYSTOLIC BLOOD PRESSURE: 120 MMHG | WEIGHT: 167.6 LBS | DIASTOLIC BLOOD PRESSURE: 86 MMHG | RESPIRATION RATE: 18 BRPM | OXYGEN SATURATION: 100 % | BODY MASS INDEX: 26.93 KG/M2 | HEIGHT: 66 IN | TEMPERATURE: 97.9 F | HEART RATE: 82 BPM

## 2022-03-28 DIAGNOSIS — Z13.6 SCREENING FOR CARDIOVASCULAR CONDITION: ICD-10-CM

## 2022-03-28 DIAGNOSIS — K21.9 LARYNGOPHARYNGEAL REFLUX (LPR): ICD-10-CM

## 2022-03-28 DIAGNOSIS — Z13.1 DIABETES MELLITUS SCREENING: ICD-10-CM

## 2022-03-28 DIAGNOSIS — M79.10 MYALGIA: ICD-10-CM

## 2022-03-28 DIAGNOSIS — N28.1 RENAL CYST: ICD-10-CM

## 2022-03-28 DIAGNOSIS — Z11.4 SCREENING FOR HIV (HUMAN IMMUNODEFICIENCY VIRUS): ICD-10-CM

## 2022-03-28 DIAGNOSIS — K21.00 GASTROESOPHAGEAL REFLUX DISEASE WITH ESOPHAGITIS WITHOUT HEMORRHAGE: ICD-10-CM

## 2022-03-28 DIAGNOSIS — E61.1 IRON DEFICIENCY: ICD-10-CM

## 2022-03-28 DIAGNOSIS — Z11.59 NEED FOR HEPATITIS C SCREENING TEST: ICD-10-CM

## 2022-03-28 DIAGNOSIS — N20.0 KIDNEY STONES: Primary | ICD-10-CM

## 2022-03-28 DIAGNOSIS — E66.3 OVERWEIGHT: ICD-10-CM

## 2022-03-28 PROBLEM — R43.9 SMELL DISTURBANCE: Status: RESOLVED | Noted: 2019-04-01 | Resolved: 2022-03-28

## 2022-03-28 PROBLEM — R59.0 ENLARGED LYMPH NODE IN NECK: Status: RESOLVED | Noted: 2019-08-07 | Resolved: 2022-03-28

## 2022-03-28 PROBLEM — J38.4 LARYNGEAL EDEMA: Status: RESOLVED | Noted: 2019-08-07 | Resolved: 2022-03-28

## 2022-03-28 PROBLEM — R09.A2 SENSATION OF FOREIGN BODY IN THROAT: Status: RESOLVED | Noted: 2019-08-07 | Resolved: 2022-03-28

## 2022-03-28 PROBLEM — R19.8 CHANGE IN BOWEL MOVEMENT: Status: RESOLVED | Noted: 2021-08-12 | Resolved: 2022-03-28

## 2022-03-28 PROBLEM — U07.1 COVID-19 VIRUS INFECTION: Status: RESOLVED | Noted: 2020-04-09 | Resolved: 2022-03-28

## 2022-03-28 PROBLEM — J04.0 REFLUX LARYNGITIS: Status: RESOLVED | Noted: 2019-08-07 | Resolved: 2022-03-28

## 2022-03-28 PROBLEM — R09.89 SENSATION OF FOREIGN BODY IN THROAT: Status: RESOLVED | Noted: 2019-08-07 | Resolved: 2022-03-28

## 2022-03-28 PROBLEM — R49.0 DYSPHONIA: Status: RESOLVED | Noted: 2019-08-07 | Resolved: 2022-03-28

## 2022-03-28 PROBLEM — R49.0 MUSCLE TENSION DYSPHONIA: Status: RESOLVED | Noted: 2019-08-07 | Resolved: 2022-03-28

## 2022-03-28 PROBLEM — R05.9 COUGH: Status: RESOLVED | Noted: 2019-08-07 | Resolved: 2022-03-28

## 2022-03-28 PROBLEM — R51.9 HEADACHE: Status: RESOLVED | Noted: 2019-04-01 | Resolved: 2022-03-28

## 2022-03-28 PROCEDURE — 99214 OFFICE O/P EST MOD 30 MIN: CPT | Performed by: FAMILY MEDICINE

## 2022-03-28 PROCEDURE — 1036F TOBACCO NON-USER: CPT | Performed by: FAMILY MEDICINE

## 2022-03-28 PROCEDURE — 3008F BODY MASS INDEX DOCD: CPT | Performed by: FAMILY MEDICINE

## 2022-03-28 PROCEDURE — 3725F SCREEN DEPRESSION PERFORMED: CPT | Performed by: FAMILY MEDICINE

## 2022-03-28 NOTE — PATIENT INSTRUCTIONS
Please contact your insurance if you are uncertain of coverage for plan of care items  Your insurance may not cover the cost of your Vitamin D blood test, which is approximately $65-70    Please notify the lab prior to blood draw if you would like to decline this test

## 2022-04-18 ENCOUNTER — LAB (OUTPATIENT)
Dept: LAB | Facility: CLINIC | Age: 47
End: 2022-04-18
Payer: COMMERCIAL

## 2022-04-18 DIAGNOSIS — E66.3 OVERWEIGHT: ICD-10-CM

## 2022-04-18 DIAGNOSIS — E61.1 IRON DEFICIENCY: ICD-10-CM

## 2022-04-18 DIAGNOSIS — K21.00 GASTROESOPHAGEAL REFLUX DISEASE WITH ESOPHAGITIS WITHOUT HEMORRHAGE: ICD-10-CM

## 2022-04-18 DIAGNOSIS — Z13.1 DIABETES MELLITUS SCREENING: ICD-10-CM

## 2022-04-18 DIAGNOSIS — Z11.4 SCREENING FOR HIV (HUMAN IMMUNODEFICIENCY VIRUS): ICD-10-CM

## 2022-04-18 DIAGNOSIS — Z13.6 SCREENING FOR CARDIOVASCULAR CONDITION: ICD-10-CM

## 2022-04-18 DIAGNOSIS — K21.9 LARYNGOPHARYNGEAL REFLUX (LPR): ICD-10-CM

## 2022-04-18 DIAGNOSIS — Z11.59 NEED FOR HEPATITIS C SCREENING TEST: ICD-10-CM

## 2022-04-18 DIAGNOSIS — M79.10 MYALGIA: ICD-10-CM

## 2022-04-18 PROBLEM — E78.2 MIXED HYPERLIPIDEMIA: Status: ACTIVE | Noted: 2022-04-01

## 2022-04-18 LAB
25(OH)D3 SERPL-MCNC: 21.1 NG/ML (ref 30–100)
ALBUMIN SERPL BCP-MCNC: 4.5 G/DL (ref 3.5–5)
ALP SERPL-CCNC: 72 U/L (ref 46–116)
ALT SERPL W P-5'-P-CCNC: 39 U/L (ref 12–78)
ANION GAP SERPL CALCULATED.3IONS-SCNC: 6 MMOL/L (ref 4–13)
AST SERPL W P-5'-P-CCNC: 24 U/L (ref 5–45)
BACTERIA UR QL AUTO: ABNORMAL /HPF
BASOPHILS # BLD AUTO: 0.03 THOUSANDS/ΜL (ref 0–0.1)
BASOPHILS NFR BLD AUTO: 1 % (ref 0–1)
BILIRUB SERPL-MCNC: 0.56 MG/DL (ref 0.2–1)
BILIRUB UR QL STRIP: NEGATIVE
BUN SERPL-MCNC: 15 MG/DL (ref 5–25)
CALCIUM SERPL-MCNC: 9.2 MG/DL (ref 8.3–10.1)
CHLORIDE SERPL-SCNC: 100 MMOL/L (ref 100–108)
CHOLEST SERPL-MCNC: 198 MG/DL
CLARITY UR: CLEAR
CO2 SERPL-SCNC: 28 MMOL/L (ref 21–32)
COLOR UR: YELLOW
CREAT SERPL-MCNC: 1.11 MG/DL (ref 0.6–1.3)
EOSINOPHIL # BLD AUTO: 0.13 THOUSAND/ΜL (ref 0–0.61)
EOSINOPHIL NFR BLD AUTO: 2 % (ref 0–6)
ERYTHROCYTE [DISTWIDTH] IN BLOOD BY AUTOMATED COUNT: 11.8 % (ref 11.6–15.1)
EST. AVERAGE GLUCOSE BLD GHB EST-MCNC: 105 MG/DL
FERRITIN SERPL-MCNC: 53 NG/ML (ref 8–388)
GFR SERPL CREATININE-BSD FRML MDRD: 79 ML/MIN/1.73SQ M
GLUCOSE P FAST SERPL-MCNC: 96 MG/DL (ref 65–99)
GLUCOSE UR STRIP-MCNC: NEGATIVE MG/DL
HBA1C MFR BLD: 5.3 %
HCT VFR BLD AUTO: 48 % (ref 36.5–49.3)
HCV AB SER QL: NORMAL
HDLC SERPL-MCNC: 47 MG/DL
HGB BLD-MCNC: 16.4 G/DL (ref 12–17)
HGB UR QL STRIP.AUTO: NEGATIVE
IMM GRANULOCYTES # BLD AUTO: 0.02 THOUSAND/UL (ref 0–0.2)
IMM GRANULOCYTES NFR BLD AUTO: 0 % (ref 0–2)
IRON SERPL-MCNC: 104 UG/DL (ref 65–175)
KETONES UR STRIP-MCNC: NEGATIVE MG/DL
LDLC SERPL CALC-MCNC: 115 MG/DL (ref 0–100)
LDLC SERPL DIRECT ASSAY-MCNC: 121 MG/DL (ref 0–100)
LEUKOCYTE ESTERASE UR QL STRIP: NEGATIVE
LYMPHOCYTES # BLD AUTO: 1.9 THOUSANDS/ΜL (ref 0.6–4.47)
LYMPHOCYTES NFR BLD AUTO: 34 % (ref 14–44)
MAGNESIUM SERPL-MCNC: 2.2 MG/DL (ref 1.6–2.6)
MCH RBC QN AUTO: 30.4 PG (ref 26.8–34.3)
MCHC RBC AUTO-ENTMCNC: 34.2 G/DL (ref 31.4–37.4)
MCV RBC AUTO: 89 FL (ref 82–98)
MONOCYTES # BLD AUTO: 0.36 THOUSAND/ΜL (ref 0.17–1.22)
MONOCYTES NFR BLD AUTO: 6 % (ref 4–12)
NEUTROPHILS # BLD AUTO: 3.22 THOUSANDS/ΜL (ref 1.85–7.62)
NEUTS SEG NFR BLD AUTO: 57 % (ref 43–75)
NITRITE UR QL STRIP: NEGATIVE
NON-SQ EPI CELLS URNS QL MICRO: ABNORMAL /HPF
NONHDLC SERPL-MCNC: 151 MG/DL
NRBC BLD AUTO-RTO: 0 /100 WBCS
PH UR STRIP.AUTO: 7 [PH]
PLATELET # BLD AUTO: 243 THOUSANDS/UL (ref 149–390)
PMV BLD AUTO: 10 FL (ref 8.9–12.7)
POTASSIUM SERPL-SCNC: 4 MMOL/L (ref 3.5–5.3)
PROT SERPL-MCNC: 8.2 G/DL (ref 6.4–8.2)
PROT UR STRIP-MCNC: NEGATIVE MG/DL
RBC # BLD AUTO: 5.39 MILLION/UL (ref 3.88–5.62)
RBC #/AREA URNS AUTO: ABNORMAL /HPF
RETICS # AUTO: NORMAL 10*3/UL (ref 14356–105094)
RETICS # CALC: 1.55 % (ref 0.37–1.87)
SODIUM SERPL-SCNC: 134 MMOL/L (ref 136–145)
SP GR UR STRIP.AUTO: 1.01 (ref 1–1.03)
TIBC SERPL-MCNC: 369 UG/DL (ref 250–450)
TRIGL SERPL-MCNC: 181 MG/DL
TSH SERPL DL<=0.05 MIU/L-ACNC: 1.17 UIU/ML (ref 0.45–4.5)
UROBILINOGEN UR QL STRIP.AUTO: 0.2 E.U./DL
WBC # BLD AUTO: 5.66 THOUSAND/UL (ref 4.31–10.16)
WBC #/AREA URNS AUTO: ABNORMAL /HPF

## 2022-04-18 PROCEDURE — 36415 COLL VENOUS BLD VENIPUNCTURE: CPT

## 2022-04-18 PROCEDURE — 87389 HIV-1 AG W/HIV-1&-2 AB AG IA: CPT

## 2022-04-18 PROCEDURE — 82728 ASSAY OF FERRITIN: CPT

## 2022-04-18 PROCEDURE — 85045 AUTOMATED RETICULOCYTE COUNT: CPT

## 2022-04-18 PROCEDURE — 83721 ASSAY OF BLOOD LIPOPROTEIN: CPT

## 2022-04-18 PROCEDURE — 83036 HEMOGLOBIN GLYCOSYLATED A1C: CPT

## 2022-04-18 PROCEDURE — 87086 URINE CULTURE/COLONY COUNT: CPT | Performed by: FAMILY MEDICINE

## 2022-04-18 PROCEDURE — 83550 IRON BINDING TEST: CPT

## 2022-04-18 PROCEDURE — 83540 ASSAY OF IRON: CPT

## 2022-04-18 PROCEDURE — 86803 HEPATITIS C AB TEST: CPT

## 2022-04-18 PROCEDURE — 84443 ASSAY THYROID STIM HORMONE: CPT

## 2022-04-18 PROCEDURE — 81001 URINALYSIS AUTO W/SCOPE: CPT | Performed by: FAMILY MEDICINE

## 2022-04-18 PROCEDURE — 83735 ASSAY OF MAGNESIUM: CPT

## 2022-04-18 PROCEDURE — 80061 LIPID PANEL: CPT

## 2022-04-18 PROCEDURE — 80053 COMPREHEN METABOLIC PANEL: CPT

## 2022-04-18 PROCEDURE — 85025 COMPLETE CBC W/AUTO DIFF WBC: CPT

## 2022-04-18 PROCEDURE — 82306 VITAMIN D 25 HYDROXY: CPT

## 2022-04-18 NOTE — RESULT ENCOUNTER NOTE
Stable urinalysis  Urine culture is still pending      Message sent to patient via [employer] patient portal

## 2022-04-18 NOTE — RESULT ENCOUNTER NOTE
Unstable labs - will review with patient at upcoming appointment  Low vitamin D - Recommend start multivitamin and over-the-counter vitamin D3 1000 - 3000 International Units daily

## 2022-04-18 NOTE — RESULT ENCOUNTER NOTE
Unstable labs - will review with patient at upcoming appointment  Hyperlipidemia - Recommend lifestyle modifications        The 10-year ASCVD risk score (Kenny Dhillon et al , 2013) is: 2 1%    Values used to calculate the score:      Age: 56 years      Sex: Male      Is Non- : No      Diabetic: No      Tobacco smoker: No      Systolic Blood Pressure: 616 mmHg      Is BP treated: No      HDL Cholesterol: 47 mg/dL      Total Cholesterol: 198 mg/dL

## 2022-04-19 LAB
BACTERIA UR CULT: NORMAL
HIV 1+2 AB+HIV1 P24 AG SERPL QL IA: NORMAL

## 2022-05-16 ENCOUNTER — OFFICE VISIT (OUTPATIENT)
Dept: FAMILY MEDICINE CLINIC | Facility: CLINIC | Age: 47
End: 2022-05-16
Payer: COMMERCIAL

## 2022-05-16 VITALS
OXYGEN SATURATION: 98 % | HEIGHT: 66 IN | BODY MASS INDEX: 26.52 KG/M2 | WEIGHT: 165 LBS | SYSTOLIC BLOOD PRESSURE: 122 MMHG | HEART RATE: 71 BPM | RESPIRATION RATE: 16 BRPM | DIASTOLIC BLOOD PRESSURE: 86 MMHG | TEMPERATURE: 98.3 F

## 2022-05-16 DIAGNOSIS — M79.10 MYALGIA: ICD-10-CM

## 2022-05-16 DIAGNOSIS — M77.12 LEFT LATERAL EPICONDYLITIS: ICD-10-CM

## 2022-05-16 DIAGNOSIS — N20.0 KIDNEY STONES: ICD-10-CM

## 2022-05-16 DIAGNOSIS — K21.9 LARYNGOPHARYNGEAL REFLUX (LPR): ICD-10-CM

## 2022-05-16 DIAGNOSIS — M25.512 CHRONIC LEFT SHOULDER PAIN: ICD-10-CM

## 2022-05-16 DIAGNOSIS — Z13.6 SCREENING FOR CARDIOVASCULAR CONDITION: ICD-10-CM

## 2022-05-16 DIAGNOSIS — Z00.00 ANNUAL PHYSICAL EXAM: Primary | ICD-10-CM

## 2022-05-16 DIAGNOSIS — E78.2 MIXED HYPERLIPIDEMIA: ICD-10-CM

## 2022-05-16 DIAGNOSIS — R05.9 COUGH: ICD-10-CM

## 2022-05-16 DIAGNOSIS — R79.89 LOW VITAMIN D LEVEL: ICD-10-CM

## 2022-05-16 DIAGNOSIS — Z13.1 SCREENING FOR DIABETES MELLITUS: ICD-10-CM

## 2022-05-16 DIAGNOSIS — S46.012A ROTATOR CUFF STRAIN, LEFT, INITIAL ENCOUNTER: ICD-10-CM

## 2022-05-16 DIAGNOSIS — E61.1 IRON DEFICIENCY: ICD-10-CM

## 2022-05-16 DIAGNOSIS — E66.3 OVERWEIGHT: ICD-10-CM

## 2022-05-16 DIAGNOSIS — N28.1 RENAL CYST: ICD-10-CM

## 2022-05-16 DIAGNOSIS — G89.29 CHRONIC LEFT SHOULDER PAIN: ICD-10-CM

## 2022-05-16 DIAGNOSIS — K21.00 GASTROESOPHAGEAL REFLUX DISEASE WITH ESOPHAGITIS WITHOUT HEMORRHAGE: ICD-10-CM

## 2022-05-16 PROCEDURE — 1036F TOBACCO NON-USER: CPT | Performed by: FAMILY MEDICINE

## 2022-05-16 PROCEDURE — 99213 OFFICE O/P EST LOW 20 MIN: CPT | Performed by: FAMILY MEDICINE

## 2022-05-16 PROCEDURE — 3008F BODY MASS INDEX DOCD: CPT | Performed by: FAMILY MEDICINE

## 2022-05-16 PROCEDURE — 99396 PREV VISIT EST AGE 40-64: CPT | Performed by: FAMILY MEDICINE

## 2022-05-16 NOTE — PATIENT INSTRUCTIONS
Low vitamin D - Recommend start multivitamin and over-the-counter vitamin D3 1000 - 3000 International Units daily  Advise Michelle Ingram med sinus rinse kit, Mucinex, Claritin/Zyrtec/Allegra, Flonase  Avoid decongestants if you have high blood pressure  You may use Tylenol (Acetaminophen) up to 3,000mg daily (in 24 hours) as needed for pain or fever  Caution with heartburn: You may use Motrin (Ibuprofen) up to 800mg 3 times daily with food (in 24 hours) as needed for pain or fever  Wellness Visit for Adults   AMBULATORY CARE:   A wellness visit  is when you see your healthcare provider to get screened for health problems  Your healthcare provider will also give you advice on how to stay healthy  Write down your questions so you remember to ask them  Ask your healthcare provider how often you should have a wellness visit  What happens at a wellness visit:  Your healthcare provider will ask about your health, and your family history of health problems  This includes high blood pressure, heart disease, and cancer  He or she will ask if you have symptoms that concern you, if you smoke, and about your mood  You may also be asked about your intake of medicines, supplements, food, and alcohol  Any of the following may be done: Your weight  will be checked  Your height may also be checked so your body mass index (BMI) can be calculated  Your BMI shows if you are at a healthy weight  Your blood pressure  and heart rate will be checked  Your temperature may also be checked  Blood and urine tests  may be done  Blood tests may be done to check your cholesterol levels  Abnormal cholesterol levels increase your risk for heart disease and stroke  You may also need a blood or urine test to check for diabetes if you are at increased risk  Urine tests may be done to look for signs of an infection or kidney disease  A physical exam  includes checking your heartbeat and lungs with a stethoscope   Your healthcare provider may also check your skin to look for sun damage  Screening tests  may be recommended  A screening test is done to check for diseases that may not cause symptoms  The screening tests you may need depend on your age, gender, family history, and lifestyle habits  For example, colorectal screening may be recommended if you are 48years old or older  Screening tests you need if you are a woman:   A Pap smear  is used to screen for cervical cancer  Pap smears are usually done every 3 to 5 years depending on your age  You may need them more often if you have had abnormal Pap smear test results in the past  Ask your healthcare provider how often you should have a Pap smear  A mammogram  is an x-ray of your breasts to screen for breast cancer  Experts recommend mammograms every 2 years starting at age 48 years  You may need a mammogram at age 52 years or younger if you have an increased risk for breast cancer  Talk to your healthcare provider about when you should start having mammograms and how often you need them  Vaccines you may need:   Get an influenza vaccine  every year  The influenza vaccine protects you from the flu  Several types of viruses cause the flu  The viruses change over time, so new vaccines are made each year  Get a tetanus-diphtheria (Td) booster vaccine  every 10 years  This vaccine protects you against tetanus and diphtheria  Tetanus is a severe infection that may cause painful muscle spasms and lockjaw  Diphtheria is a severe bacterial infection that causes a thick covering in the back of your mouth and throat  Get a human papillomavirus (HPV) vaccine  if you are female and aged 23 to 32 or male 23 to 24 and never received it  This vaccine protects you from HPV infection  HPV is the most common infection spread by sexual contact  HPV may also cause vaginal, penile, and anal cancers  Get a pneumococcal vaccine  if you are aged 72 years or older   The pneumococcal vaccine is an injection given to protect you from pneumococcal disease  Pneumococcal disease is an infection caused by pneumococcal bacteria  The infection may cause pneumonia, meningitis, or an ear infection  Get a shingles vaccine  if you are 60 or older, even if you have had shingles before  The shingles vaccine is an injection to protect you from the varicella-zoster virus  This is the same virus that causes chickenpox  Shingles is a painful rash that develops in people who had chickenpox or have been exposed to the virus  How to eat healthy:  My Plate is a model for planning healthy meals  It shows the types and amounts of foods that should go on your plate  Fruits and vegetables make up about half of your plate, and grains and protein make up the other half  A serving of dairy is included on the side of your plate  The amount of calories and serving sizes you need depends on your age, gender, weight, and height  Examples of healthy foods are listed below:  Eat a variety of vegetables  such as dark green, red, and orange vegetables  You can also include canned vegetables low in sodium (salt) and frozen vegetables without added butter or sauces  Eat a variety of fresh fruits , canned fruit in 100% juice, frozen fruit, and dried fruit  Include whole grains  At least half of the grains you eat should be whole grains  Examples include whole-wheat bread, wheat pasta, brown rice, and whole-grain cereals such as oatmeal     Eat a variety of protein foods such as seafood (fish and shellfish), lean meat, and poultry without skin (turkey and chicken)  Examples of lean meats include pork leg, shoulder, or tenderloin, and beef round, sirloin, tenderloin, and extra lean ground beef  Other protein foods include eggs and egg substitutes, beans, peas, soy products, nuts, and seeds  Choose low-fat dairy products such as skim or 1% milk or low-fat yogurt, cheese, and cottage cheese      Limit unhealthy fats  such as butter, hard margarine, and shortening  Exercise:  Exercise at least 30 minutes per day on most days of the week  Some examples of exercise include walking, biking, dancing, and swimming  You can also fit in more physical activity by taking the stairs instead of the elevator or parking farther away from stores  Include muscle strengthening activities 2 days each week  Regular exercise provides many health benefits  It helps you manage your weight, and decreases your risk for type 2 diabetes, heart disease, stroke, and high blood pressure  Exercise can also help improve your mood  Ask your healthcare provider about the best exercise plan for you  General health and safety guidelines:   Do not smoke  Nicotine and other chemicals in cigarettes and cigars can cause lung damage  Ask your healthcare provider for information if you currently smoke and need help to quit  E-cigarettes or smokeless tobacco still contain nicotine  Talk to your healthcare provider before you use these products  Limit alcohol  A drink of alcohol is 12 ounces of beer, 5 ounces of wine, or 1½ ounces of liquor  Lose weight, if needed  Being overweight increases your risk of certain health conditions  These include heart disease, high blood pressure, type 2 diabetes, and certain types of cancer  Protect your skin  Do not sunbathe or use tanning beds  Use sunscreen with a SPF 15 or higher  Apply sunscreen at least 15 minutes before you go outside  Reapply sunscreen every 2 hours  Wear protective clothing, hats, and sunglasses when you are outside  Drive safely  Always wear your seatbelt  Make sure everyone in your car wears a seatbelt  A seatbelt can save your life if you are in an accident  Do not use your cell phone when you are driving  This could distract you and cause an accident  Pull over if you need to make a call or send a text message  Practice safe sex    Use latex condoms if are sexually active and have more than one partner  Your healthcare provider may recommend screening tests for sexually transmitted infections (STIs)  Wear helmets, lifejackets, and protective gear  Always wear a helmet when you ride a bike or motorcycle, go skiing, or play sports that could cause a head injury  Wear protective equipment when you play sports  Wear a lifejacket when you are on a boat or doing water sports  © Copyright [address] 2022 Information is for End User's use only and may not be sold, redistributed or otherwise used for commercial purposes  All illustrations and images included in CareNotes® are the copyrighted property of A D A M , Inc  or [facility] [employer]   The above information is an  only  It is not intended as medical advice for individual conditions or treatments  Talk to your doctor, nurse or pharmacist before following any medical regimen to see if it is safe and effective for you  Weight Management   AMBULATORY CARE:   Why it is important to manage your weight:  Being overweight increases your risk of health conditions such as heart disease, high blood pressure, type 2 diabetes, and certain types of cancer  It can also increase your risk for osteoarthritis, sleep apnea, and other respiratory problems  Aim for a slow, steady weight loss  Even a small amount of weight loss can lower your risk of health problems  Risks of being overweight:  Extra weight can cause many health problems, including the following:  Diabetes (high blood sugar level)    High blood pressure or high cholesterol    Heart disease    Stroke    Gallbladder or liver disease    Cancer of the colon, breast, prostate, liver, or kidney    Sleep apnea    Arthritis or gout    Screening  is done to check for health conditions before you have signs or symptoms  If you are 28to 79years old, your blood sugar level may be checked every 3 years for signs of prediabetes or diabetes   Your healthcare provider will check your blood pressure at each visit  High blood pressure can lead to a stroke or other problems  Your provider may check for signs of heart disease, cancer, or other health problems  How to lose weight safely:  A safe and healthy way to lose weight is to eat fewer calories and get regular exercise  You can lose up about 1 pound a week by decreasing the number of calories you eat by 500 calories each day  You can decrease calories by eating smaller portion sizes or by cutting out high-calorie foods  Read labels to find out how many calories are in the foods you eat  You can also burn calories with exercise such as walking, swimming, or biking  You will be more likely to keep weight off if you make these changes part of your lifestyle  Exercise at least 30 minutes per day on most days of the week  You can also fit in more physical activity by taking the stairs instead of the elevator or parking farther away from stores  Ask your healthcare provider about the best exercise plan for you  Healthy meal plan for weight management:  A healthy meal plan includes a variety of foods, contains fewer calories, and helps you stay healthy  A healthy meal plan includes the following:     Eat whole-grain foods more often  A healthy meal plan should contain fiber  Fiber is the part of grains, fruits, and vegetables that is not broken down by your body  Whole-grain foods are healthy and provide extra fiber in your diet  Some examples of whole-grain foods are whole-wheat breads and pastas, oatmeal, brown rice, and bulgur  Eat a variety of vegetables every day  Include dark, leafy greens such as spinach, kale, harriett greens, and mustard greens  Eat yellow and orange vegetables such as carrots, sweet potatoes, and winter squash  Eat a variety of fruits every day  Choose fresh or canned fruit (canned in its own juice or light syrup) instead of juice  Fruit juice has very little or no fiber  Eat low-fat dairy foods    Drink fat-free (skim) milk or 1% milk  Eat fat-free yogurt and low-fat cottage cheese  Try low-fat cheeses such as mozzarella and other reduced-fat cheeses  Choose meat and other protein foods that are low in fat  Choose beans or other legumes such as split peas or lentils  Choose fish, skinless poultry (chicken or turkey), or lean cuts of red meat (beef or pork)  Before you cook meat or poultry, cut off any visible fat  Use less fat and oil  Try baking foods instead of frying them  Add less fat, such as margarine, sour cream, regular salad dressing and mayonnaise to foods  Eat fewer high-fat foods  Some examples of high-fat foods include french fries, doughnuts, ice cream, and cakes  Eat fewer sweets  Limit foods and drinks that are high in sugar  This includes candy, cookies, regular soda, and sweetened drinks  Ways to decrease calories:   Eat smaller portions  Use a small plate with smaller servings  Do not eat second helpings  When you eat at a restaurant, ask for a box and place half of your meal in the box before you eat  Share an entrée with someone else  Replace high-calorie snacks with healthy, low-calorie snacks  Choose fresh fruit, vegetables, fat-free rice cakes, or air-popped popcorn instead of potato chips, nuts, or chocolate  Choose water or calorie-free drinks instead of soda or sweetened drinks  Do not shop for groceries when you are hungry  You may be more likely to make unhealthy food choices  Take a grocery list of healthy foods and shop after you have eaten  Eat regular meals  Do not skip meals  Skipping meals can lead to overeating later in the day  This can make it harder for you to lose weight  Eat a healthy snack in place of a meal if you do not have time to eat a regular meal  Talk with a dietitian to help you create a meal plan and schedule that is right for you      Other things to consider as you try to lose weight:   Be aware of situations that may give you the urge to overeat, such as eating while watching television  Find ways to avoid these situations  For example, read a book, go for a walk, or do crafts  Meet with a weight loss support group or friends who are also trying to lose weight  This may help you stay motivated to continue working on your weight loss goals  © Copyright [employer] 2022 Information is for End User's use only and may not be sold, redistributed or otherwise used for commercial purposes  All illustrations and images included in CareNotes® are the copyrighted property of A D A M , Inc  or Yanely Pritchard   The above information is an  only  It is not intended as medical advice for individual conditions or treatments  Talk to your doctor, nurse or pharmacist before following any medical regimen to see if it is safe and effective for you  Cholesterol and Your Health   AMBULATORY CARE:   Cholesterol  is a waxy, fat-like substance  Your body uses cholesterol to make hormones and new cells, and to protect nerves  Cholesterol is made by your body  It also comes from certain foods you eat, such as meat and dairy products  Your healthcare provider can help you set goals for your cholesterol levels  He or she can help you create a plan to meet your goals  Cholesterol level goals: Your cholesterol level goals depend on your risk for heart disease, your age, and your other health conditions  The following are general guidelines: Total cholesterol  includes low-density lipoprotein (LDL), high-density lipoprotein (HDL), and triglyceride levels  The total cholesterol level should be lower than 200 mg/dL and is best at about 150 mg/dL  LDL cholesterol  is called bad cholesterol  because it forms plaque in your arteries  As plaque builds up, your arteries become narrow, and less blood flows through  When plaque decreases blood flow to your heart, you may have chest pain   If plaque completely blocks an artery that brings blood to your heart, you may have a heart attack  Plaque can break off and form blood clots  Blood clots may block arteries in your brain and cause a stroke  The level should be less than 130 mg/dL and is best at about 100 mg/dL  HDL cholesterol  is called good cholesterol  because it helps remove LDL cholesterol from your arteries  It does this by attaching to LDL cholesterol and carrying it to your liver  Your liver breaks down LDL cholesterol so your body can get rid of it  High levels of HDL cholesterol can help prevent a heart attack and stroke  Low levels of HDL cholesterol can increase your risk for heart disease, heart attack, and stroke  The level should be 60 mg/dL or higher  Triglycerides  are a type of fat that store energy from foods you eat  High levels of triglycerides also cause plaque buildup  This can increase your risk for a heart attack or stroke  If your triglyceride level is high, your LDL cholesterol level may also be high  The level should be less than 150 mg/dL  Any of the following can increase your risk for high cholesterol:   Smoking cigarettes    Being overweight or obese, or not getting enough exercise    Drinking large amounts of alcohol    A medical condition such as hypertension (high blood pressure) or diabetes    Certain genes passed from your parents to you    Age older than 65 years    What you need to know about having your cholesterol levels checked: Adults [address] of age should have their cholesterol levels checked every 4 to 6 years  Adults 45 years or older should have their cholesterol checked every 1 to 2 years  You may need your cholesterol checked more often, or at a younger age, if you have risk factors for heart disease  You may also need to have your cholesterol checked more often if you have other health conditions, such as diabetes  Blood tests are used to check cholesterol levels   Blood tests measure your levels of triglycerides, LDL cholesterol, and HDL cholesterol  How healthy fats affect your cholesterol levels:  Healthy fats, also called unsaturated fats, help lower LDL cholesterol and triglyceride levels  Healthy fats include the following:  Monounsaturated fats  are found in foods such as olive oil, canola oil, avocado, nuts, and olives  Polyunsaturated fats,  such as omega 3 fats, are found in fish, such as salmon, trout, and tuna  They can also be found in plant foods such as flaxseed, walnuts, and soybeans  How unhealthy fats affect your cholesterol levels:  Unhealthy fats increase LDL cholesterol and triglyceride levels  They are found in foods high in cholesterol, saturated fat, and trans fat:  Cholesterol  is found in eggs, dairy, and meat  Saturated fat  is found in butter, cheese, ice cream, whole milk, and coconut oil  Saturated fat is also found in meat, such as sausage, hot dogs, and bologna  Trans fat  is found in liquid oils and is used in fried and baked foods  Foods that contain trans fats include chips, crackers, muffins, sweet rolls, microwave popcorn, and cookies  Treatment  for high cholesterol will also decrease your risk of heart disease, heart attack, and stroke  Treatment may include any of the following:  Lifestyle changes  may include food, exercise, weight loss, and quitting smoking  You may also need to decrease the amount of alcohol you drink  Your healthcare provider will want you to start with lifestyle changes  Other treatment may be added if lifestyle changes are not enough  Your healthcare provider may recommend you work with a team to manage hyperlipidemia  The team may include medical experts such as a dietitian, an exercise or physical therapist, and a behavior therapist  Your family members may be included in helping you create lifestyle changes  Medicines  may be given to lower your LDL cholesterol, triglyceride levels, or total cholesterol level   You may need medicines to lower your cholesterol if any of the following is true:    You have a history of stroke, TIA, unstable angina, or a heart attack  Your LDL cholesterol level is 190 mg/dL or higher  You are age 36 to 76 years, have diabetes or heart disease risk factors, and your LDL cholesterol is 70 mg/dL or higher  Supplements  include fish oil, red yeast rice, and garlic  Fish oil may help lower your triglyceride and LDL cholesterol levels  It may also increase your HDL cholesterol level  Red yeast rice may help decrease your total cholesterol level and LDL cholesterol level  Garlic may help lower your total cholesterol level  Do not take any supplements without talking to your healthcare provider  Food changes you can make to lower your cholesterol levels:  A dietitian can help you create a healthy eating plan  He or she can show you how to read food labels and choose foods low in saturated fat, trans fats, and cholesterol  Decrease the total amount of fat you eat  Choose lean meats, fat-free or 1% fat milk, and low-fat dairy products, such as yogurt and cheese  Try to limit or avoid red meats  Limit or do not eat fried foods or baked goods, such as cookies  Replace unhealthy fats with healthy fats  Cook foods in olive oil or canola oil  Choose soft margarines that are low in saturated fat and trans fat  Seeds, nuts, and avocados are other examples of healthy fats  Eat foods with omega-3 fats  Examples include salmon, tuna, mackerel, walnuts, and flaxseed  Eat fish 2 times per week  Pregnant women should not eat fish that have high levels of mercury, such as shark, swordfish, and dasha mackerel  Increase the amount of high-fiber foods you eat  High-fiber foods can help lower your LDL cholesterol  Aim to get between 20 and 30 grams of fiber each day  Fruits and vegetables are high in fiber  Eat at least 5 servings each day   Other high-fiber foods are whole-grain or whole-wheat breads, pastas, or cereals, and brown rice  Eat 3 ounces of whole-grain foods each day  Increase fiber slowly  You may have abdominal discomfort, bloating, and gas if you add fiber to your diet too quickly  Eat healthy protein foods  Examples include low-fat dairy products, skinless chicken and turkey, fish, and nuts  Limit foods and drinks that are high in sugar  Your dietitian or healthcare provider can help you create daily limits for high-sugar foods and drinks  The limit may be lower if you have diabetes or another health condition  Limits can also help you lose weight if needed  Lifestyle changes you can make to lower your cholesterol levels:   Maintain a healthy weight  Ask your healthcare provider what a healthy weight is for you  Ask him or her to help you create a weight loss plan if needed  Weight loss can decrease your total cholesterol and triglyceride levels  Weight loss may also help keep your blood pressure at a healthy level  Be physically active throughout the day  Physical activity, such as exercise, can help lower your total cholesterol level and maintain a healthy weight  Physical activity can also help increase your HDL cholesterol level  Work with your healthcare provider to create an program that is right for you  Get at least 30 to 40 minutes of moderate physical activity most days of the week  Examples of exercise include brisk walking, swimming, or biking  Also include strength training at least 2 times each week  Your healthcare providers can help you create a physical activity plan  Do not smoke  Nicotine and other chemicals in cigarettes and cigars can raise your cholesterol levels  Ask your healthcare provider for information if you currently smoke and need help to quit  E-cigarettes or smokeless tobacco still contain nicotine  Talk to your healthcare provider before you use these products  Limit or do not drink alcohol  Alcohol can increase your triglyceride levels   Ask your healthcare provider before you drink alcohol  Ask how much is okay for you to drink in 24 hours or 1 week  Follow up with your doctor as directed:  Write down your questions so you remember to ask them during your visits  © Copyright [employer] 2022 Information is for End User's use only and may not be sold, redistributed or otherwise used for commercial purposes  All illustrations and images included in CareNotes® are the copyrighted property of A D A M , Inc  or [facility] Elma Pritchard   The above information is an  only  It is not intended as medical advice for individual conditions or treatments  Talk to your doctor, nurse or pharmacist before following any medical regimen to see if it is safe and effective for you

## 2022-05-16 NOTE — ASSESSMENT & PLAN NOTE
Stable s statin  Recommend lifestyle modifications      The 10-year ASCVD risk score (Marjan Chu et al , 2013) is: 2 2%    Values used to calculate the score:      Age: 56 years      Sex: Male      Is Non- : No      Diabetic: No      Tobacco smoker: No      Systolic Blood Pressure: 519 mmHg      Is BP treated: No      HDL Cholesterol: 47 mg/dL      Total Cholesterol: 198 mg/dL

## 2022-05-16 NOTE — PROGRESS NOTES
Assessment/Plan:  Problem List Items Addressed This Visit        Digestive    Gastroesophageal reflux disease with esophagitis     Stable without meds  Watch GERD diet  Relevant Orders    Magnesium    Laryngopharyngeal reflux (LPR)     Stable without meds  Relevant Orders    Magnesium       Genitourinary    Kidney stones     Asymptomatic, but recurrent  Pending Uro evaluation  Renal cyst     Stable  Other    Overweight     Stable  Recommend lifestyle modifications  Relevant Orders    TSH, 3rd generation with Free T4 reflex    Vitamin D 25 hydroxy    Mixed hyperlipidemia     Stable s statin  Recommend lifestyle modifications  The 10-year ASCVD risk score (Taiwo Hooks et al , 2013) is: 2 2%    Values used to calculate the score:      Age: 56 years      Sex: Male      Is Non- : No      Diabetic: No      Tobacco smoker: No      Systolic Blood Pressure: 426 mmHg      Is BP treated: No      HDL Cholesterol: 47 mg/dL      Total Cholesterol: 198 mg/dL             Relevant Orders    CBC and differential    Comprehensive metabolic panel    Lipid panel    LDL cholesterol, direct    Iron deficiency     Stable s MVI / Fe supplement               Relevant Orders    CBC and differential      Other Visit Diagnoses     Annual physical exam    -  Primary    Screening for diabetes mellitus        Relevant Orders    Hemoglobin A1C    Screening for cardiovascular condition        Relevant Orders    CBC and differential    Comprehensive metabolic panel    Lipid panel    LDL cholesterol, direct    Left lateral epicondylitis        Relevant Orders    Ambulatory Referral to Occupational Therapy    Ambulatory Referral to Hand Surgery    Chronic left shoulder pain        Relevant Orders    Ambulatory Referral to Physical Therapy    Ambulatory Referral to Orthopedic Surgery    Rotator cuff strain, left, initial encounter        Relevant Orders Ambulatory Referral to Physical Therapy    Ambulatory Referral to Orthopedic Surgery    Cough        Low vitamin D level        Relevant Orders    Vitamin D 25 hydroxy    Myalgia        Relevant Orders    Vitamin D 25 hydroxy           Return in about 1 year (around 5/16/2023) for Annual physical - HL, Labs  Future Appointments   Date Time Provider Louisa Walker   5/22/2023  8:00 AM Elma Nicholson DO [facility]        Subjective:     Mari Harding is a 56 y o  male who presents today for a follow-up on his chronic medical conditions  HPI:  Chief Complaint   Patient presents with    Physical Exam     F/U kidney stones      Labs Only     4/18/22    Medication Refill    HM     -- Above per clinical staff and reviewed  --      HPI      Today:      Physical    Watching diet  +Exercise  Overdue for Dentist   Alysa Marion for Optho  Health Maintenance   Topic Date Due    DTaP,Tdap,and Td Vaccines (1 - Tdap) 05/16/2023 (Originally 3/22/2017)    Influenza Vaccine (Season Ended) 09/01/2022    Depression Screening  03/28/2023    BMI: Followup Plan  03/28/2023    BMI: Adult  05/16/2023    Annual Physical  05/16/2023    Colorectal Cancer Screening  09/07/2031    HIV Screening  Completed    Hepatitis C Screening  Completed    COVID-19 Vaccine  Completed    Pneumococcal Vaccine: Pediatrics (0 to 5 Years) and At-Risk Patients (6 to 59 Years)  Aged Out    HIB Vaccine  Aged Out    Hepatitis B Vaccine  Aged Out    IPV Vaccine  Aged Out    Hepatitis A Vaccine  Aged Out    Meningococcal ACWY Vaccine  Aged Out    HPV Vaccine  Aged Out         The following portions of the patient's history were reviewed and updated as appropriate: allergies, current medications, past family history, past medical history, past social history, past surgical history and problem list       Review of Systems     See other note  No current outpatient medications on file       No current facility-administered medications for this visit  Objective:  /86   Pulse 71   Temp 98 3 °F (36 8 °C)   Resp 16   Ht 5' 6" (1 676 m)   Wt 74 8 kg (165 lb)   SpO2 98%   BMI 26 63 kg/m²    Wt Readings from Last 3 Encounters:   05/16/22 74 8 kg (165 lb)   03/28/22 76 kg (167 lb 9 6 oz)   10/04/21 73 8 kg (162 lb 12 8 oz)      BP Readings from Last 3 Encounters:   05/16/22 122/86   03/28/22 120/86   10/04/21 108/70          Physical Exam     Vitals and nursing note reviewed  Constitutional:       Appearance: Normal appearance  He is well-developed  HENT:      Head: Normocephalic and atraumatic  Right Ear: Tympanic membrane, ear canal and external ear normal       Left Ear: Tympanic membrane, ear canal and external ear normal       Nose: Congestion (Left nare) present  Right Sinus: No maxillary sinus tenderness or frontal sinus tenderness  Left Sinus: No maxillary sinus tenderness or frontal sinus tenderness  Mouth/Throat:      Mouth: Mucous membranes are moist       Pharynx: Oropharynx is clear  Uvula midline  Tonsils: No tonsillar exudate  Eyes:      Extraocular Movements: Extraocular movements intact  Conjunctiva/sclera: Conjunctivae normal       Pupils: Pupils are equal, round, and reactive to light  Cardiovascular:      Rate and Rhythm: Normal rate and regular rhythm  Pulses: Normal pulses  Heart sounds: Normal heart sounds  Pulmonary:      Effort: Pulmonary effort is normal       Breath sounds: Normal breath sounds  Abdominal:      General: Bowel sounds are normal  There is no distension  Palpations: Abdomen is soft  There is no mass  Tenderness: There is no abdominal tenderness  There is no guarding or rebound  Musculoskeletal:         General: No swelling or tenderness  Cervical back: Neck supple  Right lower leg: No edema  Left lower leg: No edema  Comments: B/L Clavicles stable  B/L shoulders c full AROM    Pain c Left shoulder abduction above 90 degrees  Left lateral epicondyle tender to palpation  Pain c pronation  Lymphadenopathy:      Cervical: No cervical adenopathy  Skin:     Findings: No rash  Neurological:      General: No focal deficit present  Mental Status: He is alert and oriented to person, place, and time  Psychiatric:         Mood and Affect: Mood normal          Behavior: Behavior normal          Thought Content: Thought content normal          Judgment: Judgment normal      Lab Results:      Lab Results   Component Value Date    WBC 5 66 04/18/2022    HGB 16 4 04/18/2022    HCT 48 0 04/18/2022     04/18/2022    TRIG 181 (H) 04/18/2022    HDL 47 04/18/2022    LDLDIRECT 121 (H) 04/18/2022    ALT 39 04/18/2022    AST 24 04/18/2022     12/13/2015    K 4 0 04/18/2022     04/18/2022    CREATININE 1 11 04/18/2022    BUN 15 04/18/2022    CO2 28 04/18/2022    GLUF 96 04/18/2022    HGBA1C 5 3 04/18/2022     No results found for: URICACID  Invalid input(s): BASENAME Vitamin D    No results found       POCT Labs
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
## 2023-03-06 ENCOUNTER — TELEPHONE (OUTPATIENT)
Dept: FAMILY MEDICINE CLINIC | Facility: CLINIC | Age: 48
End: 2023-03-06

## 2023-03-13 NOTE — TELEPHONE ENCOUNTER
Please call patient  May need to use   He self scheduled Tuesday, 9:00am for "Medical Questions" in [place]  Please ask him about his medical questions and reason for visit

## 2023-03-14 ENCOUNTER — PATIENT MESSAGE (OUTPATIENT)
Dept: FAMILY MEDICINE CLINIC | Facility: CLINIC | Age: 48
End: 2023-03-14

## 2023-03-14 ENCOUNTER — OFFICE VISIT (OUTPATIENT)
Dept: FAMILY MEDICINE CLINIC | Facility: CLINIC | Age: 48
End: 2023-03-14

## 2023-03-14 VITALS
WEIGHT: 175.85 LBS | RESPIRATION RATE: 16 BRPM | SYSTOLIC BLOOD PRESSURE: 124 MMHG | HEART RATE: 70 BPM | TEMPERATURE: 97 F | DIASTOLIC BLOOD PRESSURE: 76 MMHG | OXYGEN SATURATION: 98 % | BODY MASS INDEX: 28.26 KG/M2 | HEIGHT: 66 IN

## 2023-03-14 DIAGNOSIS — R23.8 PAPULE OF SKIN: ICD-10-CM

## 2023-03-14 DIAGNOSIS — R06.83 SNORING: ICD-10-CM

## 2023-03-14 DIAGNOSIS — H01.9 DERMATITIS OF EYELID OF RIGHT EYE, UNSPECIFIED TYPE: Primary | ICD-10-CM

## 2023-03-14 DIAGNOSIS — G47.50 PARASOMNIA, UNSPECIFIED TYPE: ICD-10-CM

## 2023-03-14 DIAGNOSIS — G47.00 INSOMNIA, UNSPECIFIED TYPE: ICD-10-CM

## 2023-03-14 DIAGNOSIS — E66.3 OVERWEIGHT: ICD-10-CM

## 2023-03-14 NOTE — PATIENT INSTRUCTIONS
Start over the counter Hydrocortisone cream twice daily as needed and Eucerin moisturizer as for eyelid dryness and itching  Use warm compresses on eyelid bump  Weight Management   AMBULATORY CARE:   Why it is important to manage your weight:  Being overweight increases your risk of health conditions such as heart disease, high blood pressure, type 2 diabetes, and certain types of cancer  It can also increase your risk for osteoarthritis, sleep apnea, and other respiratory problems  Aim for a slow, steady weight loss  Even a small amount of weight loss can lower your risk of health problems  Risks of being overweight:  Extra weight can cause many health problems, including the following:  Diabetes (high blood sugar level)    High blood pressure or high cholesterol    Heart disease    Stroke    Gallbladder or liver disease    Cancer of the colon, breast, prostate, liver, or kidney    Sleep apnea    Arthritis or gout    Screening  is done to check for health conditions before you have signs or symptoms  If you are 28to 79years old, your blood sugar level may be checked every 3 years for signs of prediabetes or diabetes  Your healthcare provider will check your blood pressure at each visit  High blood pressure can lead to a stroke or other problems  Your provider may check for signs of heart disease, cancer, or other health problems  How to lose weight safely:  A safe and healthy way to lose weight is to eat fewer calories and get regular exercise  You can lose up about 1 pound a week by decreasing the number of calories you eat by 500 calories each day  You can decrease calories by eating smaller portion sizes or by cutting out high-calorie foods  Read labels to find out how many calories are in the foods you eat  You can also burn calories with exercise such as walking, swimming, or biking  You will be more likely to keep weight off if you make these changes part of your lifestyle   Exercise at least 30 minutes per day on most days of the week  You can also fit in more physical activity by taking the stairs instead of the elevator or parking farther away from stores  Ask your healthcare provider about the best exercise plan for you  Healthy meal plan for weight management:  A healthy meal plan includes a variety of foods, contains fewer calories, and helps you stay healthy  A healthy meal plan includes the following:     Eat whole-grain foods more often  A healthy meal plan should contain fiber  Fiber is the part of grains, fruits, and vegetables that is not broken down by your body  Whole-grain foods are healthy and provide extra fiber in your diet  Some examples of whole-grain foods are whole-wheat breads and pastas, oatmeal, brown rice, and bulgur  Eat a variety of vegetables every day  Include dark, leafy greens such as spinach, kale, harriett greens, and mustard greens  Eat yellow and orange vegetables such as carrots, sweet potatoes, and winter squash  Eat a variety of fruits every day  Choose fresh or canned fruit (canned in its own juice or light syrup) instead of juice  Fruit juice has very little or no fiber  Eat low-fat dairy foods  Drink fat-free (skim) milk or 1% milk  Eat fat-free yogurt and low-fat cottage cheese  Try low-fat cheeses such as mozzarella and other reduced-fat cheeses  Choose meat and other protein foods that are low in fat  Choose beans or other legumes such as split peas or lentils  Choose fish, skinless poultry (chicken or turkey), or lean cuts of red meat (beef or pork)  Before you cook meat or poultry, cut off any visible fat  Use less fat and oil  Try baking foods instead of frying them  Add less fat, such as margarine, sour cream, regular salad dressing and mayonnaise to foods  Eat fewer high-fat foods  Some examples of high-fat foods include french fries, doughnuts, ice cream, and cakes  Eat fewer sweets    Limit foods and drinks that are high in sugar  This includes candy, cookies, regular soda, and sweetened drinks  Ways to decrease calories:   Eat smaller portions  Use a small plate with smaller servings  Do not eat second helpings  When you eat at a restaurant, ask for a box and place half of your meal in the box before you eat  Share an entrée with someone else  Replace high-calorie snacks with healthy, low-calorie snacks  Choose fresh fruit, vegetables, fat-free rice cakes, or air-popped popcorn instead of potato chips, nuts, or chocolate  Choose water or calorie-free drinks instead of soda or sweetened drinks  Do not shop for groceries when you are hungry  You may be more likely to make unhealthy food choices  Take a grocery list of healthy foods and shop after you have eaten  Eat regular meals  Do not skip meals  Skipping meals can lead to overeating later in the day  This can make it harder for you to lose weight  Eat a healthy snack in place of a meal if you do not have time to eat a regular meal  Talk with a dietitian to help you create a meal plan and schedule that is right for you  Other things to consider as you try to lose weight:   Be aware of situations that may give you the urge to overeat, such as eating while watching television  Find ways to avoid these situations  For example, read a book, go for a walk, or do crafts  Meet with a weight loss support group or friends who are also trying to lose weight  This may help you stay motivated to continue working on your weight loss goals  © Copyright [employer] 2022 Information is for End User's use only and may not be sold, redistributed or otherwise used for commercial purposes  The above information is an  only  It is not intended as medical advice for individual conditions or treatments  Talk to your doctor, nurse or pharmacist before following any medical regimen to see if it is safe and effective for you

## 2023-03-20 ENCOUNTER — TELEPHONE (OUTPATIENT)
Dept: SLEEP CENTER | Facility: CLINIC | Age: 48
End: 2023-03-20

## 2023-03-20 NOTE — TELEPHONE ENCOUNTER
----- Message from Otto Scherer MD sent at 3/15/2023 12:14 PM EDT -----  Approved    ----- Message -----  From: Dmitriy Kiser  Sent: 3/15/2023   8:22 AM EDT  To: Sleep Medicine [facility] Provider    This home sleep study needs approval      If approved please sign and return to clerical pool  If denied please include reasons why  Also provide alternative testing if warranted  Please sign and return to clerical pool 
Detail Level: Zone

## 2023-05-12 ENCOUNTER — RA CDI HCC (OUTPATIENT)
Dept: OTHER | Facility: HOSPITAL | Age: 48
End: 2023-05-12

## 2023-05-12 NOTE — PROGRESS NOTES
[facility]  coding opportunities       Chart reviewed, no opportunity found: CHART REVIEWED, NO OPPORTUNITY FOUND        Patients Insurance        Commercial Insurance: [address] [place] [address]

## 2023-05-19 ENCOUNTER — LAB (OUTPATIENT)
Dept: LAB | Facility: CLINIC | Age: 48
End: 2023-05-19

## 2023-05-19 DIAGNOSIS — K21.00 GASTROESOPHAGEAL REFLUX DISEASE WITH ESOPHAGITIS WITHOUT HEMORRHAGE: ICD-10-CM

## 2023-05-19 DIAGNOSIS — Z13.6 SCREENING FOR CARDIOVASCULAR CONDITION: ICD-10-CM

## 2023-05-19 DIAGNOSIS — K21.9 LARYNGOPHARYNGEAL REFLUX (LPR): ICD-10-CM

## 2023-05-19 DIAGNOSIS — R79.89 LOW VITAMIN D LEVEL: ICD-10-CM

## 2023-05-19 DIAGNOSIS — Z13.1 SCREENING FOR DIABETES MELLITUS: ICD-10-CM

## 2023-05-19 DIAGNOSIS — M79.10 MYALGIA: ICD-10-CM

## 2023-05-19 DIAGNOSIS — E66.3 OVERWEIGHT: ICD-10-CM

## 2023-05-19 DIAGNOSIS — E61.1 IRON DEFICIENCY: ICD-10-CM

## 2023-05-19 DIAGNOSIS — E78.2 MIXED HYPERLIPIDEMIA: ICD-10-CM

## 2023-05-19 LAB
25(OH)D3 SERPL-MCNC: 22.4 NG/ML (ref 30–100)
ALBUMIN SERPL BCP-MCNC: 4.6 G/DL (ref 3.5–5)
ALP SERPL-CCNC: 59 U/L (ref 34–104)
ALT SERPL W P-5'-P-CCNC: 30 U/L (ref 7–52)
ANION GAP SERPL CALCULATED.3IONS-SCNC: 4 MMOL/L (ref 4–13)
AST SERPL W P-5'-P-CCNC: 23 U/L (ref 13–39)
BASOPHILS # BLD AUTO: 0.02 THOUSANDS/ÂΜL (ref 0–0.1)
BASOPHILS NFR BLD AUTO: 0 % (ref 0–1)
BILIRUB SERPL-MCNC: 0.64 MG/DL (ref 0.2–1)
BUN SERPL-MCNC: 11 MG/DL (ref 5–25)
CALCIUM SERPL-MCNC: 9.5 MG/DL (ref 8.4–10.2)
CHLORIDE SERPL-SCNC: 103 MMOL/L (ref 96–108)
CHOLEST SERPL-MCNC: 174 MG/DL
CO2 SERPL-SCNC: 31 MMOL/L (ref 21–32)
CREAT SERPL-MCNC: 1.03 MG/DL (ref 0.6–1.3)
EOSINOPHIL # BLD AUTO: 0.07 THOUSAND/ÂΜL (ref 0–0.61)
EOSINOPHIL NFR BLD AUTO: 1 % (ref 0–6)
ERYTHROCYTE [DISTWIDTH] IN BLOOD BY AUTOMATED COUNT: 11.5 % (ref 11.6–15.1)
EST. AVERAGE GLUCOSE BLD GHB EST-MCNC: 108 MG/DL
GFR SERPL CREATININE-BSD FRML MDRD: 86 ML/MIN/1.73SQ M
GLUCOSE P FAST SERPL-MCNC: 92 MG/DL (ref 65–99)
HBA1C MFR BLD: 5.4 %
HCT VFR BLD AUTO: 45 % (ref 36.5–49.3)
HDLC SERPL-MCNC: 43 MG/DL
HGB BLD-MCNC: 15.5 G/DL (ref 12–17)
IMM GRANULOCYTES # BLD AUTO: 0.02 THOUSAND/UL (ref 0–0.2)
IMM GRANULOCYTES NFR BLD AUTO: 0 % (ref 0–2)
LDLC SERPL CALC-MCNC: 83 MG/DL (ref 0–100)
LDLC SERPL DIRECT ASSAY-MCNC: 97 MG/DL (ref 0–100)
LYMPHOCYTES # BLD AUTO: 1.94 THOUSANDS/ÂΜL (ref 0.6–4.47)
LYMPHOCYTES NFR BLD AUTO: 35 % (ref 14–44)
MAGNESIUM SERPL-MCNC: 2.2 MG/DL (ref 1.9–2.7)
MCH RBC QN AUTO: 30.9 PG (ref 26.8–34.3)
MCHC RBC AUTO-ENTMCNC: 34.4 G/DL (ref 31.4–37.4)
MCV RBC AUTO: 90 FL (ref 82–98)
MONOCYTES # BLD AUTO: 0.42 THOUSAND/ÂΜL (ref 0.17–1.22)
MONOCYTES NFR BLD AUTO: 8 % (ref 4–12)
NEUTROPHILS # BLD AUTO: 3.01 THOUSANDS/ÂΜL (ref 1.85–7.62)
NEUTS SEG NFR BLD AUTO: 56 % (ref 43–75)
NONHDLC SERPL-MCNC: 131 MG/DL
NRBC BLD AUTO-RTO: 0 /100 WBCS
PLATELET # BLD AUTO: 209 THOUSANDS/UL (ref 149–390)
PMV BLD AUTO: 10 FL (ref 8.9–12.7)
POTASSIUM SERPL-SCNC: 4 MMOL/L (ref 3.5–5.3)
PROT SERPL-MCNC: 7.2 G/DL (ref 6.4–8.4)
RBC # BLD AUTO: 5.02 MILLION/UL (ref 3.88–5.62)
SODIUM SERPL-SCNC: 138 MMOL/L (ref 135–147)
TRIGL SERPL-MCNC: 241 MG/DL
TSH SERPL DL<=0.05 MIU/L-ACNC: 1.6 UIU/ML (ref 0.45–4.5)
WBC # BLD AUTO: 5.48 THOUSAND/UL (ref 4.31–10.16)
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
## 2023-05-22 ENCOUNTER — OFFICE VISIT (OUTPATIENT)
Dept: FAMILY MEDICINE CLINIC | Facility: CLINIC | Age: 48
End: 2023-05-22

## 2023-05-22 VITALS
OXYGEN SATURATION: 100 % | RESPIRATION RATE: 16 BRPM | TEMPERATURE: 97.5 F | WEIGHT: 172 LBS | BODY MASS INDEX: 27.64 KG/M2 | SYSTOLIC BLOOD PRESSURE: 122 MMHG | HEART RATE: 97 BPM | HEIGHT: 66 IN | DIASTOLIC BLOOD PRESSURE: 88 MMHG

## 2023-05-22 DIAGNOSIS — Z13.6 SCREENING FOR CARDIOVASCULAR CONDITION: ICD-10-CM

## 2023-05-22 DIAGNOSIS — R79.89 LOW VITAMIN D LEVEL: ICD-10-CM

## 2023-05-22 DIAGNOSIS — N28.1 RENAL CYST: ICD-10-CM

## 2023-05-22 DIAGNOSIS — K21.9 LARYNGOPHARYNGEAL REFLUX (LPR): ICD-10-CM

## 2023-05-22 DIAGNOSIS — E78.2 MIXED HYPERLIPIDEMIA: ICD-10-CM

## 2023-05-22 DIAGNOSIS — K21.00 GASTROESOPHAGEAL REFLUX DISEASE WITH ESOPHAGITIS WITHOUT HEMORRHAGE: ICD-10-CM

## 2023-05-22 DIAGNOSIS — E61.1 IRON DEFICIENCY: ICD-10-CM

## 2023-05-22 DIAGNOSIS — E66.3 OVERWEIGHT: ICD-10-CM

## 2023-05-22 DIAGNOSIS — Z00.00 ANNUAL PHYSICAL EXAM: Primary | ICD-10-CM

## 2023-05-22 DIAGNOSIS — N20.0 KIDNEY STONES: ICD-10-CM

## 2023-05-22 DIAGNOSIS — Z13.1 SCREENING FOR DIABETES MELLITUS: ICD-10-CM

## 2023-05-22 NOTE — PATIENT INSTRUCTIONS
Low vitamin D - Recommend start multivitamin and over-the-counter vitamin D3 1000 - 3000 International Units daily  Please contact your insurance if you are uncertain of coverage for plan of care items  Your insurance may not cover the cost of your Vitamin D blood test, which is approximately $65-70  Please notify the lab prior to blood draw if you would like to decline this test             Wellness Visit for Adults   AMBULATORY CARE:   A wellness visit  is when you see your healthcare provider to get screened for health problems  Your healthcare provider will also give you advice on how to stay healthy  Write down your questions so you remember to ask them  Ask your healthcare provider how often you should have a wellness visit  What happens at a wellness visit:  Your healthcare provider will ask about your health, and your family history of health problems  This includes high blood pressure, heart disease, and cancer  He or she will ask if you have symptoms that concern you, if you smoke, and about your mood  You may also be asked about your intake of medicines, supplements, food, and alcohol  Any of the following may be done: Your weight  will be checked  Your height may also be checked so your body mass index (BMI) can be calculated  Your BMI shows if you are at a healthy weight  Your blood pressure  and heart rate will be checked  Your temperature may also be checked  Blood and urine tests  may be done  Blood tests may be done to check your cholesterol levels  Abnormal cholesterol levels increase your risk for heart disease and stroke  You may also need a blood or urine test to check for diabetes if you are at increased risk  Urine tests may be done to look for signs of an infection or kidney disease  A physical exam  includes checking your heartbeat and lungs with a stethoscope  Your healthcare provider may also check your skin to look for sun damage  Screening tests  may be recommended  A screening test is done to check for diseases that may not cause symptoms  The screening tests you may need depend on your age, gender, family history, and lifestyle habits  For example, colorectal screening may be recommended if you are 48years old or older  Screening tests you need if you are a woman:   A Pap smear  is used to screen for cervical cancer  Pap smears are usually done every 3 to 5 years depending on your age  You may need them more often if you have had abnormal Pap smear test results in the past  Ask your healthcare provider how often you should have a Pap smear  A mammogram  is an x-ray of your breasts to screen for breast cancer  Experts recommend mammograms every 2 years starting at age 48 years  You may need a mammogram at age 52 years or younger if you have an increased risk for breast cancer  Talk to your healthcare provider about when you should start having mammograms and how often you need them  Vaccines you may need:   Get an influenza vaccine  every year  The influenza vaccine protects you from the flu  Several types of viruses cause the flu  The viruses change over time, so new vaccines are made each year  Get a tetanus-diphtheria (Td) booster vaccine  every 10 years  This vaccine protects you against tetanus and diphtheria  Tetanus is a severe infection that may cause painful muscle spasms and lockjaw  Diphtheria is a severe bacterial infection that causes a thick covering in the back of your mouth and throat  Get a human papillomavirus (HPV) vaccine  if you are female and aged 23 to 32 or male 23 to 24 and never received it  This vaccine protects you from HPV infection  HPV is the most common infection spread by sexual contact  HPV may also cause vaginal, penile, and anal cancers  Get a pneumococcal vaccine  if you are aged 72 years or older  The pneumococcal vaccine is an injection given to protect you from pneumococcal disease   Pneumococcal disease is an infection caused by pneumococcal bacteria  The infection may cause pneumonia, meningitis, or an ear infection  Get a shingles vaccine  if you are 60 or older, even if you have had shingles before  The shingles vaccine is an injection to protect you from the varicella-zoster virus  This is the same virus that causes chickenpox  Shingles is a painful rash that develops in people who had chickenpox or have been exposed to the virus  How to eat healthy:  My Plate is a model for planning healthy meals  It shows the types and amounts of foods that should go on your plate  Fruits and vegetables make up about half of your plate, and grains and protein make up the other half  A serving of dairy is included on the side of your plate  The amount of calories and serving sizes you need depends on your age, gender, weight, and height  Examples of healthy foods are listed below:  Eat a variety of vegetables  such as dark green, red, and orange vegetables  You can also include canned vegetables low in sodium (salt) and frozen vegetables without added butter or sauces  Eat a variety of fresh fruits , canned fruit in 100% juice, frozen fruit, and dried fruit  Include whole grains  At least half of the grains you eat should be whole grains  Examples include whole-wheat bread, wheat pasta, brown rice, and whole-grain cereals such as oatmeal     Eat a variety of protein foods such as seafood (fish and shellfish), lean meat, and poultry without skin (turkey and chicken)  Examples of lean meats include pork leg, shoulder, or tenderloin, and beef round, sirloin, tenderloin, and extra lean ground beef  Other protein foods include eggs and egg substitutes, beans, peas, soy products, nuts, and seeds  Choose low-fat dairy products such as skim or 1% milk or low-fat yogurt, cheese, and cottage cheese  Limit unhealthy fats  such as butter, hard margarine, and shortening         Exercise:  Exercise at least 30 minutes per day on most days of the week  Some examples of exercise include walking, biking, dancing, and swimming  You can also fit in more physical activity by taking the stairs instead of the elevator or parking farther away from stores  Include muscle strengthening activities 2 days each week  Regular exercise provides many health benefits  It helps you manage your weight, and decreases your risk for type 2 diabetes, heart disease, stroke, and high blood pressure  Exercise can also help improve your mood  Ask your healthcare provider about the best exercise plan for you  General health and safety guidelines:   Do not smoke  Nicotine and other chemicals in cigarettes and cigars can cause lung damage  Ask your healthcare provider for information if you currently smoke and need help to quit  E-cigarettes or smokeless tobacco still contain nicotine  Talk to your healthcare provider before you use these products  Limit alcohol  A drink of alcohol is 12 ounces of beer, 5 ounces of wine, or 1½ ounces of liquor  Lose weight, if needed  Being overweight increases your risk of certain health conditions  These include heart disease, high blood pressure, type 2 diabetes, and certain types of cancer  Protect your skin  Do not sunbathe or use tanning beds  Use sunscreen with a SPF 15 or higher  Apply sunscreen at least 15 minutes before you go outside  Reapply sunscreen every 2 hours  Wear protective clothing, hats, and sunglasses when you are outside  Drive safely  Always wear your seatbelt  Make sure everyone in your car wears a seatbelt  A seatbelt can save your life if you are in an accident  Do not use your cell phone when you are driving  This could distract you and cause an accident  Pull over if you need to make a call or send a text message  Practice safe sex  Use latex condoms if are sexually active and have more than one partner   Your healthcare provider may recommend screening tests for sexually transmitted infections (STIs)  Wear helmets, lifejackets, and protective gear  Always wear a helmet when you ride a bike or motorcycle, go skiing, or play sports that could cause a head injury  Wear protective equipment when you play sports  Wear a lifejacket when you are on a boat or doing water sports  © Copyright [employer] 2022 Information is for End User's use only and may not be sold, redistributed or otherwise used for commercial purposes  The above information is an  only  It is not intended as medical advice for individual conditions or treatments  Talk to your doctor, nurse or pharmacist before following any medical regimen to see if it is safe and effective for you  Weight Management   AMBULATORY CARE:   Why it is important to manage your weight:  Being overweight increases your risk of health conditions such as heart disease, high blood pressure, type 2 diabetes, and certain types of cancer  It can also increase your risk for osteoarthritis, sleep apnea, and other respiratory problems  Aim for a slow, steady weight loss  Even a small amount of weight loss can lower your risk of health problems  Risks of being overweight:  Extra weight can cause many health problems, including the following:  Diabetes (high blood sugar level)    High blood pressure or high cholesterol    Heart disease    Stroke    Gallbladder or liver disease    Cancer of the colon, breast, prostate, liver, or kidney    Sleep apnea    Arthritis or gout    Screening  is done to check for health conditions before you have signs or symptoms  If you are 28to 79years old, your blood sugar level may be checked every 3 years for signs of prediabetes or diabetes  Your healthcare provider will check your blood pressure at each visit  High blood pressure can lead to a stroke or other problems  Your provider may check for signs of heart disease, cancer, or other health problems    How to lose weight safely:  A safe and healthy way to lose weight is to eat fewer calories and get regular exercise  You can lose up about 1 pound a week by decreasing the number of calories you eat by 500 calories each day  You can decrease calories by eating smaller portion sizes or by cutting out high-calorie foods  Read labels to find out how many calories are in the foods you eat  You can also burn calories with exercise such as walking, swimming, or biking  You will be more likely to keep weight off if you make these changes part of your lifestyle  Exercise at least 30 minutes per day on most days of the week  You can also fit in more physical activity by taking the stairs instead of the elevator or parking farther away from stores  Ask your healthcare provider about the best exercise plan for you  Healthy meal plan for weight management:  A healthy meal plan includes a variety of foods, contains fewer calories, and helps you stay healthy  A healthy meal plan includes the following:     Eat whole-grain foods more often  A healthy meal plan should contain fiber  Fiber is the part of grains, fruits, and vegetables that is not broken down by your body  Whole-grain foods are healthy and provide extra fiber in your diet  Some examples of whole-grain foods are whole-wheat breads and pastas, oatmeal, brown rice, and bulgur  Eat a variety of vegetables every day  Include dark, leafy greens such as spinach, kale, harriett greens, and mustard greens  Eat yellow and orange vegetables such as carrots, sweet potatoes, and winter squash  Eat a variety of fruits every day  Choose fresh or canned fruit (canned in its own juice or light syrup) instead of juice  Fruit juice has very little or no fiber  Eat low-fat dairy foods  Drink fat-free (skim) milk or 1% milk  Eat fat-free yogurt and low-fat cottage cheese  Try low-fat cheeses such as mozzarella and other reduced-fat cheeses  Choose meat and other protein foods that are low in fat    Choose beans or other legumes such as split peas or lentils  Choose fish, skinless poultry (chicken or turkey), or lean cuts of red meat (beef or pork)  Before you cook meat or poultry, cut off any visible fat  Use less fat and oil  Try baking foods instead of frying them  Add less fat, such as margarine, sour cream, regular salad dressing and mayonnaise to foods  Eat fewer high-fat foods  Some examples of high-fat foods include french fries, doughnuts, ice cream, and cakes  Eat fewer sweets  Limit foods and drinks that are high in sugar  This includes candy, cookies, regular soda, and sweetened drinks  Ways to decrease calories:   Eat smaller portions  Use a small plate with smaller servings  Do not eat second helpings  When you eat at a restaurant, ask for a box and place half of your meal in the box before you eat  Share an entrée with someone else  Replace high-calorie snacks with healthy, low-calorie snacks  Choose fresh fruit, vegetables, fat-free rice cakes, or air-popped popcorn instead of potato chips, nuts, or chocolate  Choose water or calorie-free drinks instead of soda or sweetened drinks  Do not shop for groceries when you are hungry  You may be more likely to make unhealthy food choices  Take a grocery list of healthy foods and shop after you have eaten  Eat regular meals  Do not skip meals  Skipping meals can lead to overeating later in the day  This can make it harder for you to lose weight  Eat a healthy snack in place of a meal if you do not have time to eat a regular meal  Talk with a dietitian to help you create a meal plan and schedule that is right for you  Other things to consider as you try to lose weight:   Be aware of situations that may give you the urge to overeat, such as eating while watching television  Find ways to avoid these situations  For example, read a book, go for a walk, or do crafts      Meet with a weight loss support group or friends who are also trying to lose weight  This may help you stay motivated to continue working on your weight loss goals  © Copyright [employer] 2022 Information is for End User's use only and may not be sold, redistributed or otherwise used for commercial purposes  The above information is an  only  It is not intended as medical advice for individual conditions or treatments  Talk to your doctor, nurse or pharmacist before following any medical regimen to see if it is safe and effective for you  Cholesterol and Your Health   AMBULATORY CARE:   Cholesterol  is a waxy, fat-like substance  Your body uses cholesterol to make hormones and new cells, and to protect nerves  Cholesterol is made by your body  It also comes from certain foods you eat, such as meat and dairy products  Your healthcare provider can help you set goals for your cholesterol levels  He or she can help you create a plan to meet your goals  Cholesterol level goals: Your cholesterol level goals depend on your risk for heart disease, your age, and your other health conditions  The following are general guidelines: Total cholesterol  includes low-density lipoprotein (LDL), high-density lipoprotein (HDL), and triglyceride levels  The total cholesterol level should be lower than 200 mg/dL and is best at about 150 mg/dL  LDL cholesterol  is called bad cholesterol  because it forms plaque in your arteries  As plaque builds up, your arteries become narrow, and less blood flows through  When plaque decreases blood flow to your heart, you may have chest pain  If plaque completely blocks an artery that brings blood to your heart, you may have a heart attack  Plaque can break off and form blood clots  Blood clots may block arteries in your brain and cause a stroke  The level should be less than 130 mg/dL and is best at about 100 mg/dL  HDL cholesterol  is called good cholesterol  because it helps remove LDL cholesterol from your arteries   It does this by attaching to LDL cholesterol and carrying it to your liver  Your liver breaks down LDL cholesterol so your body can get rid of it  High levels of HDL cholesterol can help prevent a heart attack and stroke  Low levels of HDL cholesterol can increase your risk for heart disease, heart attack, and stroke  The level should be 60 mg/dL or higher  Triglycerides  are a type of fat that store energy from foods you eat  High levels of triglycerides also cause plaque buildup  This can increase your risk for a heart attack or stroke  If your triglyceride level is high, your LDL cholesterol level may also be high  The level should be less than 150 mg/dL  Any of the following can increase your risk for high cholesterol:   Smoking cigarettes    Being overweight or obese, or not getting enough exercise    Drinking large amounts of alcohol    A medical condition such as hypertension (high blood pressure) or diabetes    Certain genes passed from your parents to you    Age older than 65 years    What you need to know about having your cholesterol levels checked: Adults 21to 39years of age should have their cholesterol levels checked every 4 to 6 years  Adults 45 years or older should have their cholesterol checked every 1 to 2 years  You may need your cholesterol checked more often, or at a younger age, if you have risk factors for heart disease  You may also need to have your cholesterol checked more often if you have other health conditions, such as diabetes  Blood tests are used to check cholesterol levels  Blood tests measure your levels of triglycerides, LDL cholesterol, and HDL cholesterol  How healthy fats affect your cholesterol levels:  Healthy fats, also called unsaturated fats, help lower LDL cholesterol and triglyceride levels  Healthy fats include the following:  Monounsaturated fats  are found in foods such as olive oil, canola oil, avocado, nuts, and olives      Polyunsaturated fats,  such as omega 3 fats, are found in fish, such as salmon, trout, and tuna  They can also be found in plant foods such as flaxseed, walnuts, and soybeans  How unhealthy fats affect your cholesterol levels:  Unhealthy fats increase LDL cholesterol and triglyceride levels  They are found in foods high in cholesterol, saturated fat, and trans fat:  Cholesterol  is found in eggs, dairy, and meat  Saturated fat  is found in butter, cheese, ice cream, whole milk, and coconut oil  Saturated fat is also found in meat, such as sausage, hot dogs, and bologna  Trans fat  is found in liquid oils and is used in fried and baked foods  Foods that contain trans fats include chips, crackers, muffins, sweet rolls, microwave popcorn, and cookies  Treatment  for high cholesterol will also decrease your risk of heart disease, heart attack, and stroke  Treatment may include any of the following:  Lifestyle changes  may include food, exercise, weight loss, and quitting smoking  You may also need to decrease the amount of alcohol you drink  Your healthcare provider will want you to start with lifestyle changes  Other treatment may be added if lifestyle changes are not enough  Your healthcare provider may recommend you work with a team to manage hyperlipidemia  The team may include medical experts such as a dietitian, an exercise or physical therapist, and a behavior therapist  Your family members may be included in helping you create lifestyle changes  Medicines  may be given to lower your LDL cholesterol, triglyceride levels, or total cholesterol level  You may need medicines to lower your cholesterol if any of the following is true:    You have a history of stroke, TIA, unstable angina, or a heart attack  Your LDL cholesterol level is 190 mg/dL or higher  You are age 36 to 76 years, have diabetes or heart disease risk factors, and your LDL cholesterol is 70 mg/dL or higher      Supplements  include fish oil, red yeast rice, and garlic  Fish oil may help lower your triglyceride and LDL cholesterol levels  It may also increase your HDL cholesterol level  Red yeast rice may help decrease your total cholesterol level and LDL cholesterol level  Garlic may help lower your total cholesterol level  Do not take any supplements without talking to your healthcare provider  Food changes you can make to lower your cholesterol levels:  A dietitian can help you create a healthy eating plan  He or she can show you how to read food labels and choose foods low in saturated fat, trans fats, and cholesterol  Decrease the total amount of fat you eat  Choose lean meats, fat-free or 1% fat milk, and low-fat dairy products, such as yogurt and cheese  Try to limit or avoid red meats  Limit or do not eat fried foods or baked goods, such as cookies  Replace unhealthy fats with healthy fats  Cook foods in olive oil or canola oil  Choose soft margarines that are low in saturated fat and trans fat  Seeds, nuts, and avocados are other examples of healthy fats  Eat foods with omega-3 fats  Examples include salmon, tuna, mackerel, walnuts, and flaxseed  Eat fish 2 times per week  Pregnant women should not eat fish that have high levels of mercury, such as shark, swordfish, and dasha mackerel  Increase the amount of high-fiber foods you eat  High-fiber foods can help lower your LDL cholesterol  Aim to get between 20 and 30 grams of fiber each day  Fruits and vegetables are high in fiber  Eat at least 5 servings each day  Other high-fiber foods are whole-grain or whole-wheat breads, pastas, or cereals, and brown rice  Eat 3 ounces of whole-grain foods each day  Increase fiber slowly  You may have abdominal discomfort, bloating, and gas if you add fiber to your diet too quickly  Eat healthy protein foods  Examples include low-fat dairy products, skinless chicken and turkey, fish, and nuts  Limit foods and drinks that are high in sugar  Your dietitian or healthcare provider can help you create daily limits for high-sugar foods and drinks  The limit may be lower if you have diabetes or another health condition  Limits can also help you lose weight if needed  Lifestyle changes you can make to lower your cholesterol levels:   Maintain a healthy weight  Ask your healthcare provider what a healthy weight is for you  Ask him or her to help you create a weight loss plan if needed  Weight loss can decrease your total cholesterol and triglyceride levels  Weight loss may also help keep your blood pressure at a healthy level  Be physically active throughout the day  Physical activity, such as exercise, can help lower your total cholesterol level and maintain a healthy weight  Physical activity can also help increase your HDL cholesterol level  Work with your healthcare provider to create an program that is right for you  Get at least 30 to 40 minutes of moderate physical activity most days of the week  Examples of exercise include brisk walking, swimming, or biking  Also include strength training at least 2 times each week  Your healthcare providers can help you create a physical activity plan  Do not smoke  Nicotine and other chemicals in cigarettes and cigars can raise your cholesterol levels  Ask your healthcare provider for information if you currently smoke and need help to quit  E-cigarettes or smokeless tobacco still contain nicotine  Talk to your healthcare provider before you use these products  Limit or do not drink alcohol  Alcohol can increase your triglyceride levels  Ask your healthcare provider before you drink alcohol  Ask how much is okay for you to drink in 24 hours or 1 week  Follow up with your doctor as directed:  Write down your questions so you remember to ask them during your visits    © Copyright [employer] 2022 Information is for End User's use only and may not be sold, redistributed or otherwise used for commercial purposes  The above information is an  only  It is not intended as medical advice for individual conditions or treatments  Talk to your doctor, nurse or pharmacist before following any medical regimen to see if it is safe and effective for you

## 2023-05-22 NOTE — RESULT ENCOUNTER NOTE
Unstable labs - reviewed with patient at today's appointment  Low vitamin D - Recommend start multivitamin and over-the-counter vitamin D3 1000 - 3000 International Units daily  Normal HgA1C - no diabetes / prediabetes        Message sent to patient via [employer] patient portal

## 2023-05-22 NOTE — ASSESSMENT & PLAN NOTE
Stable s statin  Recommend lifestyle modifications      The 10-year ASCVD risk score (Clement SHARPE, et al , 2019) is: 2 2%    Values used to calculate the score:      Age: 53 years      Sex: Male      Is Non- : No      Diabetic: No      Tobacco smoker: No      Systolic Blood Pressure: 907 mmHg      Is BP treated: No      HDL Cholesterol: 43 mg/dL      Total Cholesterol: 174 mg/dL 67F s/p R revision TKA POD 3  Analgesia  DVT ppx  WBAT RLE  PT/OT  Incentive spirometry  DC planning to rehab

## 2023-05-28 ENCOUNTER — HOSPITAL ENCOUNTER (OUTPATIENT)
Dept: SLEEP CENTER | Facility: CLINIC | Age: 48
Discharge: HOME | End: 2023-05-28

## 2023-05-28 DIAGNOSIS — G47.00 INSOMNIA, UNSPECIFIED TYPE: ICD-10-CM

## 2023-05-28 DIAGNOSIS — R06.83 SNORING: ICD-10-CM

## 2023-05-28 DIAGNOSIS — G47.50 PARASOMNIA, UNSPECIFIED TYPE: ICD-10-CM

## 2023-05-28 NOTE — PROGRESS NOTES
Home Sleep Study Documentation    HOME STUDY DEVICE: Noxturnal yes                                           Noelle G3 no      Pre-Sleep Home Study:    Set-up and instructions performed by: MM    Technician performed demonstration for Patient: yes    Return demonstration performed by Patient: yes    Written instructions provided to Patient: yes    Patient signed consent form: yes        Post-Sleep Home Study:    Additional comments by Patient: None    Home Sleep Study Failed:no:    Failure reason: N/A    Reported or Detected: N/A    Scored by: ABDULLAHI Atkins

## 2023-05-30 DIAGNOSIS — R06.83 SNORING: ICD-10-CM

## 2023-05-30 DIAGNOSIS — G47.31 CENTRAL SLEEP APNEA: Primary | ICD-10-CM

## 2023-05-31 ENCOUNTER — PATIENT MESSAGE (OUTPATIENT)
Dept: FAMILY MEDICINE CLINIC | Facility: CLINIC | Age: 48
End: 2023-05-31

## 2023-05-31 NOTE — RESULT ENCOUNTER NOTE
Sleep study shows central sleep apnea  Nurse to see orders to complete sleep titration study, ideally prior to sleep medicine appointment 6/26/2023  Message sent to patient via [facility] patient portal     Nurse to also call patient

## 2023-06-16 ENCOUNTER — TELEPHONE (OUTPATIENT)
Dept: SLEEP CENTER | Facility: CLINIC | Age: 48
End: 2023-06-16

## 2023-06-16 NOTE — TELEPHONE ENCOUNTER
[phone]   [id number]  Call placed to patient  Left message home sleep study is resulted and shows central sleep apneas  CPAP titration recommended  Provided telephone number for central scheduling  Telephone number for nursing staff also provided  Patient has consultation 6/26/2023 at the [facility] office with Sleep Fellow 02

## 2023-06-26 ENCOUNTER — OFFICE VISIT (OUTPATIENT)
Age: 48
End: 2023-06-26
Payer: COMMERCIAL

## 2023-06-26 ENCOUNTER — TELEPHONE (OUTPATIENT)
Dept: SLEEP CENTER | Facility: CLINIC | Age: 48
End: 2023-06-26

## 2023-06-26 VITALS
HEART RATE: 90 BPM | SYSTOLIC BLOOD PRESSURE: 120 MMHG | DIASTOLIC BLOOD PRESSURE: 84 MMHG | BODY MASS INDEX: 27.64 KG/M2 | WEIGHT: 172 LBS | HEIGHT: 66 IN | TEMPERATURE: 98.1 F | OXYGEN SATURATION: 99 %

## 2023-06-26 DIAGNOSIS — G47.31 CENTRAL SLEEP APNEA: Primary | ICD-10-CM

## 2023-06-26 DIAGNOSIS — G47.50 PARASOMNIA, UNSPECIFIED TYPE: ICD-10-CM

## 2023-06-26 PROCEDURE — 99203 OFFICE O/P NEW LOW 30 MIN: CPT | Performed by: INTERNAL MEDICINE

## 2023-06-26 NOTE — TELEPHONE ENCOUNTER
----- Message from Davion Quijano MD sent at 6/23/2023 10:02 PM EDT -----  Regarding: RE:   Approved  ----- Message -----  From: Mandi Pradhan  Sent: 6/22/2023   8:48 AM EDT  To: Sleep Medicine Dinesh Provider    This Cpap sleep study needs approval      If approved please sign and return to clerical pool  If denied please include reasons why  Also provide alternative testing if warranted  Please sign and return to clerical pool

## 2023-06-26 NOTE — ASSESSMENT & PLAN NOTE
Cannot rule out RBD  No obvious contributory factors such as medical problems, head trauma, medications  Attention should be paid during CPAP titration study to evaluate for evidence of REM without atonia, abnormal behaviors of sleep      May need MRI brain in the future if symptoms persist
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
## 2023-06-26 NOTE — ASSESSMENT & PLAN NOTE
Overall AHI 6 1 on recent home sleep study  More than 50% of this were central sleep apnea  I could not evaluate the overall data on this sleep study to assess the validity of these central apneas  Nevertheless, due to the symptoms of snoring, witnessed apneas, and reported parasomnias, treatment of any sleep apnea is indicated  He will undergo CPAP titration study  He is not on opioids  There are no signs of heart failure

## 2023-12-05 NOTE — TELEPHONE ENCOUNTER
Called patient and discussed that waiting until Nov  would be too long and am recommending he reach out to other offices and networks to see if any sooner availability  Additionally, discussed with patient that would like to trial him on triple therapy for possible H pylori which he is now amenable too  Discussed that patient will need to finish a 14 day course of amoxil 1000mg BID, clarithromycin 500mg BID and protonix 40mg BID (which patient is already taking)  Will reassess symptoms after abx course has been completed  In the interim, patient is aware that if symptoms continue to work and he is not able to tolerate PO, develops fevers, N/V, or severe abdominal pain that he should be assessed in ED  DISCHARGE ORDER  Date/Time 2023 2:47 PM  Completed by: Mario Thomason, RN, Case Management    Patient Name: Sena Hou    : 1953      Admit order Date and Status: IP 2023  Noted discharge order. (verify MD's last order for status of admission/Traditional Medicare 3 [place] Inpatient qualifying stay required for SNF)    Confirmed discharge plan with:              Patient:  Yes                                   Discharge to Facility:   Name: [facility]  Address: [address] [place] [address] [address] [place], [place], [address]  Phone: [phone]  Fax:  [phone]    Facility phone number for staff giving report: see above   Pre-certification completed: [facility] Exemption Notification (HENS) completed: [facility]   Discharge orders and Continuity of Care faxed to facility:  [facility]      Transportation:               Medical Transport explained with choice list offered to pt/family. Choice: per Roundtrip (no preference)  Agency used: [address] [place] [address] Street up time:   16:30      Pt/family/Nursing/Facility aware of  time:  Patient  [facility] (Romy Robert)  Deysi RN  Puja Terrell. Ambulance form completed: YES     Date Last IMM Given: 2023    Comments:    Pt is being d/c'd to [facility] (SNF) today. Pt's O2 sats are 95% on 2 liters. Discharge timeout done with Deysi RN. All discharge needs and concerns addressed. Discharging nurse to complete DINO, reconcile AVS, and place final copy with patient's discharge packet. Discharging RN to ensure that written prescriptions for  Level II medications are sent with patient to the facility as per protocol.

## 2025-05-08 ENCOUNTER — HOSPITAL ENCOUNTER (EMERGENCY)
Facility: HOSPITAL | Age: 50
Discharge: HOME | End: 2025-05-08
Attending: EMERGENCY MEDICINE
Payer: COMMERCIAL

## 2025-05-08 ENCOUNTER — APPOINTMENT (EMERGENCY)
Dept: CT IMAGING | Facility: HOSPITAL | Age: 50
End: 2025-05-08
Payer: COMMERCIAL

## 2025-05-08 VITALS
TEMPERATURE: 98.4 F | OXYGEN SATURATION: 99 % | SYSTOLIC BLOOD PRESSURE: 160 MMHG | RESPIRATION RATE: 18 BRPM | HEART RATE: 62 BPM | DIASTOLIC BLOOD PRESSURE: 96 MMHG

## 2025-05-08 DIAGNOSIS — G47.33 OBSTRUCTIVE SLEEP APNEA: ICD-10-CM

## 2025-05-08 DIAGNOSIS — N23 RENAL COLIC ON RIGHT SIDE: Primary | ICD-10-CM

## 2025-05-08 DIAGNOSIS — K40.90 RIGHT INGUINAL HERNIA: ICD-10-CM

## 2025-05-08 DIAGNOSIS — M26.609 TMJ DYSFUNCTION: ICD-10-CM

## 2025-05-08 LAB
ALBUMIN SERPL BCG-MCNC: 4.7 G/DL (ref 3.5–5)
ALP SERPL-CCNC: 54 U/L (ref 34–104)
ALT SERPL W P-5'-P-CCNC: 25 U/L (ref 7–52)
ANION GAP SERPL CALCULATED.3IONS-SCNC: 6 MMOL/L (ref 4–13)
AST SERPL W P-5'-P-CCNC: 19 U/L (ref 13–39)
BACTERIA UR QL AUTO: ABNORMAL /HPF
BASOPHILS # BLD AUTO: 0.03 THOUSANDS/ÂΜL (ref 0–0.1)
BASOPHILS NFR BLD AUTO: 1 % (ref 0–1)
BILIRUB DIRECT SERPL-MCNC: 0.08 MG/DL (ref 0–0.2)
BILIRUB SERPL-MCNC: 0.69 MG/DL (ref 0.2–1)
BILIRUB UR QL STRIP: NEGATIVE
BUN SERPL-MCNC: 13 MG/DL (ref 5–25)
CALCIUM SERPL-MCNC: 9.4 MG/DL (ref 8.4–10.2)
CHLORIDE SERPL-SCNC: 103 MMOL/L (ref 96–108)
CLARITY UR: CLEAR
CO2 SERPL-SCNC: 28 MMOL/L (ref 21–32)
COLOR UR: ABNORMAL
CREAT SERPL-MCNC: 1.05 MG/DL (ref 0.6–1.3)
EOSINOPHIL # BLD AUTO: 0.06 THOUSAND/ÂΜL (ref 0–0.61)
EOSINOPHIL NFR BLD AUTO: 1 % (ref 0–6)
ERYTHROCYTE [DISTWIDTH] IN BLOOD BY AUTOMATED COUNT: 11.6 % (ref 11.6–15.1)
GFR SERPL CREATININE-BSD FRML MDRD: 82 ML/MIN/1.73SQ M
GLUCOSE SERPL-MCNC: 94 MG/DL (ref 65–140)
GLUCOSE UR STRIP-MCNC: NEGATIVE MG/DL
HCT VFR BLD AUTO: 43.1 % (ref 36.5–49.3)
HGB BLD-MCNC: 14.8 G/DL (ref 12–17)
HGB UR QL STRIP.AUTO: ABNORMAL
IMM GRANULOCYTES # BLD AUTO: 0.01 THOUSAND/UL (ref 0–0.2)
IMM GRANULOCYTES NFR BLD AUTO: 0 % (ref 0–2)
KETONES UR STRIP-MCNC: NEGATIVE MG/DL
LEUKOCYTE ESTERASE UR QL STRIP: NEGATIVE
LIPASE SERPL-CCNC: 28 U/L (ref 11–82)
LYMPHOCYTES # BLD AUTO: 2.3 THOUSANDS/ÂΜL (ref 0.6–4.47)
LYMPHOCYTES NFR BLD AUTO: 37 % (ref 14–44)
MCH RBC QN AUTO: 30.5 PG (ref 26.8–34.3)
MCHC RBC AUTO-ENTMCNC: 34.3 G/DL (ref 31.4–37.4)
MCV RBC AUTO: 89 FL (ref 82–98)
MONOCYTES # BLD AUTO: 0.44 THOUSAND/ÂΜL (ref 0.17–1.22)
MONOCYTES NFR BLD AUTO: 7 % (ref 4–12)
NEUTROPHILS # BLD AUTO: 3.47 THOUSANDS/ÂΜL (ref 1.85–7.62)
NEUTS SEG NFR BLD AUTO: 54 % (ref 43–75)
NITRITE UR QL STRIP: NEGATIVE
NON-SQ EPI CELLS URNS QL MICRO: ABNORMAL /HPF
NRBC BLD AUTO-RTO: 0 /100 WBCS
PH UR STRIP.AUTO: 7 [PH]
PLATELET # BLD AUTO: 229 THOUSANDS/UL (ref 149–390)
PMV BLD AUTO: 10.1 FL (ref 8.9–12.7)
POTASSIUM SERPL-SCNC: 3.9 MMOL/L (ref 3.5–5.3)
PROT SERPL-MCNC: 7.3 G/DL (ref 6.4–8.4)
PROT UR STRIP-MCNC: NEGATIVE MG/DL
RBC # BLD AUTO: 4.86 MILLION/UL (ref 3.88–5.62)
RBC #/AREA URNS AUTO: ABNORMAL /HPF
SODIUM SERPL-SCNC: 137 MMOL/L (ref 135–147)
SP GR UR STRIP.AUTO: >=1.05 (ref 1–1.03)
UROBILINOGEN UR STRIP-ACNC: <2 MG/DL
WBC # BLD AUTO: 6.31 THOUSAND/UL (ref 4.31–10.16)
WBC #/AREA URNS AUTO: ABNORMAL /HPF

## 2025-05-08 PROCEDURE — 99284 EMERGENCY DEPT VISIT MOD MDM: CPT

## 2025-05-08 PROCEDURE — 81001 URINALYSIS AUTO W/SCOPE: CPT | Performed by: EMERGENCY MEDICINE

## 2025-05-08 PROCEDURE — 36415 COLL VENOUS BLD VENIPUNCTURE: CPT | Performed by: EMERGENCY MEDICINE

## 2025-05-08 PROCEDURE — 80076 HEPATIC FUNCTION PANEL: CPT | Performed by: EMERGENCY MEDICINE

## 2025-05-08 PROCEDURE — 83690 ASSAY OF LIPASE: CPT | Performed by: EMERGENCY MEDICINE

## 2025-05-08 PROCEDURE — 96374 THER/PROPH/DIAG INJ IV PUSH: CPT

## 2025-05-08 PROCEDURE — 74177 CT ABD & PELVIS W/CONTRAST: CPT

## 2025-05-08 PROCEDURE — 96361 HYDRATE IV INFUSION ADD-ON: CPT

## 2025-05-08 PROCEDURE — 96375 TX/PRO/DX INJ NEW DRUG ADDON: CPT

## 2025-05-08 PROCEDURE — 99285 EMERGENCY DEPT VISIT HI MDM: CPT | Performed by: EMERGENCY MEDICINE

## 2025-05-08 PROCEDURE — 80048 BASIC METABOLIC PNL TOTAL CA: CPT | Performed by: EMERGENCY MEDICINE

## 2025-05-08 PROCEDURE — 85025 COMPLETE CBC W/AUTO DIFF WBC: CPT | Performed by: EMERGENCY MEDICINE

## 2025-05-08 RX ORDER — HYDROMORPHONE HCL/PF 1 MG/ML
0.5 SYRINGE (ML) INJECTION ONCE
Refills: 0 | Status: DISCONTINUED | OUTPATIENT
Start: 2025-05-08 | End: 2025-05-08

## 2025-05-08 RX ORDER — HYDROMORPHONE HCL/PF 1 MG/ML
0.5 SYRINGE (ML) INJECTION ONCE
Status: COMPLETED | OUTPATIENT
Start: 2025-05-08 | End: 2025-05-08

## 2025-05-08 RX ORDER — KETOROLAC TROMETHAMINE 30 MG/ML
15 INJECTION, SOLUTION INTRAMUSCULAR; INTRAVENOUS ONCE
Status: COMPLETED | OUTPATIENT
Start: 2025-05-08 | End: 2025-05-08

## 2025-05-08 RX ORDER — ONDANSETRON 4 MG/1
4 TABLET, ORALLY DISINTEGRATING ORAL EVERY 8 HOURS PRN
Qty: 12 TABLET | Refills: 0 | Status: SHIPPED | OUTPATIENT
Start: 2025-05-08 | End: 2025-05-12

## 2025-05-08 RX ORDER — OXYCODONE HYDROCHLORIDE 5 MG/1
5 TABLET ORAL EVERY 6 HOURS PRN
Qty: 12 TABLET | Refills: 0 | Status: SHIPPED | OUTPATIENT
Start: 2025-05-08 | End: 2025-05-11

## 2025-05-08 RX ORDER — IBUPROFEN 600 MG/1
600 TABLET, FILM COATED ORAL EVERY 6 HOURS PRN
Qty: 20 TABLET | Refills: 0 | Status: SHIPPED | OUTPATIENT
Start: 2025-05-08 | End: 2025-05-13

## 2025-05-08 RX ADMIN — KETOROLAC TROMETHAMINE 15 MG: 30 INJECTION, SOLUTION INTRAMUSCULAR; INTRAVENOUS at 10:30

## 2025-05-08 RX ADMIN — HYDROMORPHONE HYDROCHLORIDE 0.5 MG: 1 INJECTION, SOLUTION INTRAMUSCULAR; INTRAVENOUS; SUBCUTANEOUS at 09:47

## 2025-05-08 RX ADMIN — IOHEXOL 100 ML: 350 INJECTION, SOLUTION INTRAVENOUS at 10:51

## 2025-05-08 RX ADMIN — SODIUM CHLORIDE 1000 ML: 0.9 INJECTION, SOLUTION INTRAVENOUS at 11:23

## 2025-05-08 NOTE — Clinical Note
Erich Cruz was seen and treated in our emergency department on 5/8/2025.    No restrictions            Diagnosis: Renal colic due to kidney stone    Erich  is off the rest of the shift today, may return to work on return date.    He may return on this date: 05/09/2025         If you have any questions or concerns, please don't hesitate to call.      Anca Brooke, DO    ______________________________           _______________          _______________  Hospital Representative                              Date                                Time

## 2025-05-08 NOTE — ED ATTENDING ATTESTATION
5/8/2025  I, Concha Oreilly MD, saw and evaluated the patient. I have discussed the patient with the resident/non-physician practitioner and agree with the resident's/non-physician practitioner's findings, Plan of Care, and MDM as documented in the resident's/non-physician practitioner's note, except where noted. All available labs and Radiology studies were reviewed.  I was present for key portions of any procedure(s) performed by the resident/non-physician practitioner and I was immediately available to provide assistance.       At this point I agree with the current assessment done in the Emergency Department.  I have conducted an independent evaluation of this patient a history and physical is as follows:    50-year-old male presenting to the ER with right-sided flank pain, no associate nausea vomiting.  No fevers or chills.  Right lower quadrant abdomen is tender.  Has some dark urine.  Denies pain or burning with urination.  History of kidney stones.  Feels similar.    Agree with workup, differential includes renal colic, septic stone, pulm nephritis, UTI, appendicitis.  Agree with abdominal labs, urine, pain control, CT      ED Course         Critical Care Time  Procedures

## 2025-05-08 NOTE — ED PROVIDER NOTES
Time reflects when diagnosis was documented in both MDM as applicable and the Disposition within this note       Time User Action Codes Description Comment    5/8/2025 12:14 PM Concha Oreilly Add [N23] Renal colic on right side     5/8/2025 12:17 PM Concha Oreilly Add [K40.90] Right inguinal hernia     5/8/2025  1:42 PM Anca Brooke Add [M26.609] TMJ dysfunction     5/8/2025  1:42 PM Anca Brooke Add [G47.33] Obstructive sleep apnea           ED Disposition       ED Disposition   Discharge    Condition   Stable    Date/Time   Thu May 8, 2025 12:14 PM    Comment   Erich Anthony discharge to home/self care.                   Assessment & Plan       Medical Decision Making  51 y/o M with a PMHx of GERD, LAYO, and nephrolithiasis who presents with 10/10 right flank pain radiating to the groin overnight    Physical exam significant for pt in acute distress, with + CVA tenderness, and tenderness on the right side of the abdomen    Ddx including but not limited to: nephrolithiasis vs nephrolithiasis vs cholecystitis vs appendicitis vs other abdominal pathology    Will obtain CBC, BMP, HFP, UA with reflex to cx, lipase, and CT AP with contrast  Will give 0.5 mg dilaudid for pain     Pain improved to 7/10, but was having significant dizziness, and pt appeared to be uncomfortable  Will give 15 mg IV toradol    Amount and/or Complexity of Data Reviewed  Labs: ordered.  Radiology: ordered.    Risk  Prescription drug management.             Medications   HYDROmorphone (DILAUDID) injection 0.5 mg (0.5 mg Intravenous Given 5/8/25 0947)   ketorolac (TORADOL) injection 15 mg (15 mg Intravenous Given 5/8/25 1030)   iohexol (OMNIPAQUE) 350 MG/ML injection (MULTI-DOSE) 100 mL (100 mL Intravenous Given 5/8/25 1051)   sodium chloride 0.9 % bolus 1,000 mL (0 mL Intravenous Stopped 5/8/25 1223)       ED Risk Strat Scores                    No data recorded        SBIRT 22yo+      Flowsheet Row Most Recent Value   Initial Alcohol  Screen: US AUDIT-C     1. How often do you have a drink containing alcohol? 0 Filed at: 05/08/2025 1133   2. How many drinks containing alcohol do you have on a typical day you are drinking?  0 Filed at: 05/08/2025 1138   3a. Male UNDER 65: How often do you have five or more drinks on one occasion? 0 Filed at: 05/08/2025 1138   Audit-C Score 0 Filed at: 05/08/2025 1137   GAUDENCIO: How many times in the past year have you...    Used an illegal drug or used a prescription medication for non-medical reasons? Never Filed at: 05/08/2025 1135                            History of Present Illness       Chief Complaint   Patient presents with    Flank Pain     To has R flank pain hx of kidney stones, started this AM, reports blood in urine        Past Medical History:   Diagnosis Date    Central sleep apnea 05/30/2023    GERD (gastroesophageal reflux disease)     Internal hemorrhoids     Kidney stones     Laryngopharyngeal reflux (LPR)     Low iron     Migraine     Mixed hyperlipidemia 04/2022    Overweight       Past Surgical History:   Procedure Laterality Date    NO PAST SURGERIES        Family History   Problem Relation Age of Onset    Colon cancer Mother 60    Stomach cancer Mother 60    Hypertension Mother     COPD Father     No Known Problems Brother     No Known Problems Brother     No Known Problems Son     No Known Problems Son     Diabetes Maternal Grandmother     Heart attack Maternal Grandfather     Diabetes Other     Cancer Other       Social History     Tobacco Use    Smoking status: Never    Smokeless tobacco: Never   Vaping Use    Vaping status: Never Used   Substance Use Topics    Alcohol use: No    Drug use: No      E-Cigarette/Vaping    E-Cigarette Use Never User       E-Cigarette/Vaping Substances      I have reviewed and agree with the history as documented.     49 y/o M with a PMHx of GERD, LAYO, and nephrolithiasis who presents with right flank pain that started yesterday night and acutely worsened this  "AM to 10/10 in severity now. He explained that the pain feels like a \"burning\" sensation radiating from his right flank down to his groin, similar to previous flares, and that he noted brown urine this morning. The pain is worsened by movement, and he had to leave work early. He denies nausea, vomiting, or changes in his urine or stool.      History provided by:  Patient  Flank Pain  Pain location:  R flank  Pain quality: burning and sharp    Pain radiates to:  Groin  Pain severity:  Severe  Onset quality:  Gradual  Timing:  Constant  Progression:  Waxing and waning  Chronicity:  Recurrent  Context: not alcohol use, not awakening from sleep, not diet changes, not eating, not laxative use, not medication withdrawal, not previous surgeries, not recent illness, not recent travel, not sick contacts and not suspicious food intake    Relieved by:  Nothing  Worsened by:  Movement  Associated symptoms: hematuria and nausea    Associated symptoms: no chest pain, no constipation, no cough, no dysuria, no fatigue, no fever, no flatus, no hematemesis, no hematochezia, no shortness of breath and no vomiting    Risk factors: no alcohol abuse        Review of Systems   Constitutional:  Negative for fatigue and fever.   Respiratory:  Negative for cough, shortness of breath and wheezing.    Cardiovascular:  Negative for chest pain, palpitations and leg swelling.   Gastrointestinal:  Positive for abdominal pain and nausea. Negative for constipation, flatus, hematemesis, hematochezia and vomiting.   Genitourinary:  Positive for flank pain and hematuria. Negative for dysuria and urgency.   Neurological:  Negative for dizziness, weakness, light-headedness and headaches.           Objective       ED Triage Vitals [05/08/25 0919]   Temperature Pulse Blood Pressure Respirations SpO2 Patient Position - Orthostatic VS   98.4 °F (36.9 °C) 62 160/96 18 99 % Lying      Temp Source Heart Rate Source BP Location FiO2 (%) Pain Score    Oral " Monitor Right arm -- 5      Vitals      Date and Time Temp Pulse SpO2 Resp BP Pain Score FACES Pain Rating User   05/08/25 1145 -- -- -- -- -- No Pain -- KL   05/08/25 1030 -- -- -- -- -- 7 -- LD   05/08/25 0947 -- -- -- -- -- 10 - Worst Possible Pain --    05/08/25 0919 98.4 °F (36.9 °C) 62 99 % 18 160/96 5 -- MM            Physical Exam  Vitals reviewed.   Constitutional:       General: He is in acute distress.      Appearance: Normal appearance.   HENT:      Head: Normocephalic and atraumatic.      Mouth/Throat:      Mouth: Mucous membranes are dry.      Pharynx: Oropharynx is clear.   Eyes:      General: No scleral icterus.     Extraocular Movements: Extraocular movements intact.      Conjunctiva/sclera: Conjunctivae normal.   Cardiovascular:      Rate and Rhythm: Normal rate and regular rhythm.      Heart sounds: Normal heart sounds. No murmur heard.     No friction rub. No gallop.   Pulmonary:      Effort: Pulmonary effort is normal. No respiratory distress.      Breath sounds: Normal breath sounds. No stridor. No wheezing, rhonchi or rales.   Abdominal:      General: Bowel sounds are normal.      Tenderness: There is abdominal tenderness. There is right CVA tenderness and guarding. There is no left CVA tenderness.   Musculoskeletal:      Right lower leg: No edema.      Left lower leg: No edema.   Skin:     General: Skin is warm and dry.   Neurological:      General: No focal deficit present.      Mental Status: He is alert and oriented to person, place, and time.   Psychiatric:         Behavior: Behavior normal.         Results Reviewed       Procedure Component Value Units Date/Time    Urine Microscopic [[id number]]  (Abnormal) Collected: 05/08/25 1135    Lab Status: Final result Specimen: Urine, Clean Catch Updated: 05/08/25 1149     RBC, UA Innumerable /hpf      WBC, UA 1-2 /hpf      Epithelial Cells None Seen /hpf      Bacteria, UA None Seen /hpf     UA w Reflex to Microscopic w Reflex to Culture  [[id number]]  (Abnormal) Collected: 05/08/25 1135    Lab Status: Final result Specimen: Urine, Clean Catch Updated: 05/08/25 1148     Color, UA Light Yellow     Clarity, UA Clear     Specific Gravity, UA >=1.050     pH, UA 7.0     Leukocytes, UA Negative     Nitrite, UA Negative     Protein, UA Negative mg/dl      Glucose, UA Negative mg/dl      Ketones, UA Negative mg/dl      Urobilinogen, UA <2.0 mg/dl      Bilirubin, UA Negative     Occult Blood, UA Large    CBC and differential [[id number]] Collected: 05/08/25 0929    Lab Status: Final result Specimen: Blood from Arm, Left Updated: 05/08/25 1113     WBC 6.31 Thousand/uL      RBC 4.86 Million/uL      Hemoglobin 14.8 g/dL      Hematocrit 43.1 %      MCV 89 fL      MCH 30.5 pg      MCHC 34.3 g/dL      RDW 11.6 %      MPV 10.1 fL      Platelets 229 Thousands/uL      nRBC 0 /100 WBCs      Segmented % 54 %      Immature Grans % 0 %      Lymphocytes % 37 %      Monocytes % 7 %      Eosinophils Relative 1 %      Basophils Relative 1 %      Absolute Neutrophils 3.47 Thousands/µL      Absolute Immature Grans 0.01 Thousand/uL      Absolute Lymphocytes 2.30 Thousands/µL      Absolute Monocytes 0.44 Thousand/µL      Eosinophils Absolute 0.06 Thousand/µL      Basophils Absolute 0.03 Thousands/µL     Basic metabolic panel [[id number]] Collected: 05/08/25 0929    Lab Status: Final result Specimen: Blood from Arm, Left Updated: 05/08/25 1019     Sodium 137 mmol/L      Potassium 3.9 mmol/L      Chloride 103 mmol/L      CO2 28 mmol/L      ANION GAP 6 mmol/L      BUN 13 mg/dL      Creatinine 1.05 mg/dL      Glucose 94 mg/dL      Calcium 9.4 mg/dL      eGFR 82 ml/min/1.73sq m     Narrative:      [employer] guidelines for Chronic Kidney Disease (CKD):     Stage 1 with normal or high GFR (GFR > 90 mL/min/1.73 square meters)    Stage 2 Mild CKD (GFR = 60-89 mL/min/1.73 square meters)    Stage 3A Moderate CKD (GFR = 45-59 mL/min/1.73 square meters)    Stage 3B  Moderate CKD (GFR = 30-44 mL/min/1.73 square meters)    Stage 4 Severe CKD (GFR = 15-29 mL/min/1.73 square meters)    Stage 5 End Stage CKD (GFR <15 mL/min/1.73 square meters)  Note: GFR calculation is accurate only with a steady state creatinine    Hepatic function panel [[id number]]  (Normal) Collected: 05/08/25 0929    Lab Status: Final result Specimen: Blood from Arm, Left Updated: 05/08/25 1019     Total Bilirubin 0.69 mg/dL      Bilirubin, Direct 0.08 mg/dL      Alkaline Phosphatase 54 U/L      AST 19 U/L      ALT 25 U/L      Total Protein 7.3 g/dL      Albumin 4.7 g/dL     Lipase [[id number]]  (Normal) Collected: 05/08/25 0929    Lab Status: Final result Specimen: Blood from Arm, Left Updated: 05/08/25 1019     Lipase 28 u/L             CT abdomen pelvis with contrast   Final Interpretation by Magdaleno Bal MD (05/08 1133)      A 5 mm proximal right ureteral calculus causes mild obstructive uropathy.         Workstation performed: [id number]             Procedures    ED Medication and Procedure Management   None     Discharge Medication List as of 5/8/2025 12:17 PM        START taking these medications    Details   ibuprofen (MOTRIN) 600 mg tablet Take 1 tablet (600 mg total) by mouth every 6 (six) hours as needed for mild pain or moderate pain for up to 5 days, Starting Thu 5/8/2025, Until Tue 5/13/2025 at 2359, Normal      ondansetron (ZOFRAN-ODT) 4 mg disintegrating tablet Take 1 tablet (4 mg total) by mouth every 8 (eight) hours as needed for nausea or vomiting for up to 4 days, Starting Thu 5/8/2025, Until Mon 5/12/2025 at 2359, Normal      oxyCODONE (Roxicodone) 5 immediate release tablet Take 1 tablet (5 mg total) by mouth every 6 (six) hours as needed for severe pain for up to 3 days Max Daily Amount: 20 mg, Starting Thu 5/8/2025, Until Sun 5/11/2025 at 2359, Normal             ED SEPSIS DOCUMENTATION   Time reflects when diagnosis was documented in both MDM as applicable and the Disposition within  this note       Time User Action Codes Description Comment    5/8/2025 12:14 PM Concha Oreilly Add [N23] Renal colic on right side     5/8/2025 12:17 PM Concha Oreilly Add [K40.90] Right inguinal hernia     5/8/2025  1:42 PM Anca Brooke Add [M26.609] TMJ dysfunction     5/8/2025  1:42 PM Anca Brooke Add [G47.33] Obstructive sleep apnea                  Anca Brooke DO  05/08/25 1016       Anca Brooke DO  05/09/25 1541